# Patient Record
Sex: MALE | Race: WHITE | NOT HISPANIC OR LATINO | Employment: FULL TIME | URBAN - METROPOLITAN AREA
[De-identification: names, ages, dates, MRNs, and addresses within clinical notes are randomized per-mention and may not be internally consistent; named-entity substitution may affect disease eponyms.]

---

## 2019-03-11 ENCOUNTER — TELEPHONE (OUTPATIENT)
Dept: SCHEDULING | Facility: IMAGING CENTER | Age: 39
End: 2019-03-11

## 2019-03-11 ENCOUNTER — APPOINTMENT (OUTPATIENT)
Dept: MEDICAL GROUP | Facility: MEDICAL CENTER | Age: 39
End: 2019-03-11
Payer: COMMERCIAL

## 2019-03-12 ENCOUNTER — HOSPITAL ENCOUNTER (OUTPATIENT)
Dept: RADIOLOGY | Facility: MEDICAL CENTER | Age: 39
End: 2019-03-12
Attending: EMERGENCY MEDICINE
Payer: COMMERCIAL

## 2019-03-12 ENCOUNTER — OFFICE VISIT (OUTPATIENT)
Dept: URGENT CARE | Facility: PHYSICIAN GROUP | Age: 39
End: 2019-03-12
Payer: COMMERCIAL

## 2019-03-12 VITALS
TEMPERATURE: 97 F | DIASTOLIC BLOOD PRESSURE: 82 MMHG | RESPIRATION RATE: 18 BRPM | OXYGEN SATURATION: 98 % | SYSTOLIC BLOOD PRESSURE: 128 MMHG | WEIGHT: 213 LBS | HEART RATE: 95 BPM

## 2019-03-12 DIAGNOSIS — S93.401A SPRAIN OF RIGHT ANKLE, UNSPECIFIED LIGAMENT, INITIAL ENCOUNTER: ICD-10-CM

## 2019-03-12 DIAGNOSIS — S99.911A ANKLE INJURY, RIGHT, INITIAL ENCOUNTER: ICD-10-CM

## 2019-03-12 PROCEDURE — 99202 OFFICE O/P NEW SF 15 MIN: CPT | Performed by: EMERGENCY MEDICINE

## 2019-03-12 PROCEDURE — 73610 X-RAY EXAM OF ANKLE: CPT | Mod: RT

## 2019-03-12 ASSESSMENT — ENCOUNTER SYMPTOMS
SENSORY CHANGE: 0
LOSS OF SENSATION: 0
NUMBNESS: 0
LOSS OF MOTION: 0
TINGLING: 0
INABILITY TO BEAR WEIGHT: 0
FEVER: 0
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
## 2019-03-20 ENCOUNTER — OFFICE VISIT (OUTPATIENT)
Dept: MEDICAL GROUP | Facility: CLINIC | Age: 39
End: 2019-03-20
Payer: COMMERCIAL

## 2019-03-20 ENCOUNTER — APPOINTMENT (OUTPATIENT)
Dept: RADIOLOGY | Facility: IMAGING CENTER | Age: 39
End: 2019-03-20
Attending: FAMILY MEDICINE
Payer: COMMERCIAL

## 2019-03-20 VITALS
HEIGHT: 74 IN | BODY MASS INDEX: 27.34 KG/M2 | SYSTOLIC BLOOD PRESSURE: 122 MMHG | DIASTOLIC BLOOD PRESSURE: 88 MMHG | OXYGEN SATURATION: 95 % | WEIGHT: 213 LBS | TEMPERATURE: 98.8 F | HEART RATE: 100 BPM | RESPIRATION RATE: 14 BRPM

## 2019-03-20 DIAGNOSIS — M79.671 RIGHT FOOT PAIN: ICD-10-CM

## 2019-03-20 DIAGNOSIS — S93.492A SPRAIN OF ANTERIOR TALOFIBULAR LIGAMENT OF LEFT ANKLE, INITIAL ENCOUNTER: ICD-10-CM

## 2019-03-20 PROCEDURE — 73630 X-RAY EXAM OF FOOT: CPT | Mod: TC,RT | Performed by: FAMILY MEDICINE

## 2019-03-20 PROCEDURE — 99203 OFFICE O/P NEW LOW 30 MIN: CPT | Performed by: FAMILY MEDICINE

## 2019-03-20 ASSESSMENT — ENCOUNTER SYMPTOMS
SENSORY CHANGE: 0
FEVER: 0
SORE THROAT: 0
SHORTNESS OF BREATH: 0
FOCAL WEAKNESS: 0
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
## 2019-03-28 ENCOUNTER — PHYSICAL THERAPY (OUTPATIENT)
Dept: PHYSICAL THERAPY | Facility: REHABILITATION | Age: 39
End: 2019-03-28
Attending: FAMILY MEDICINE
Payer: COMMERCIAL

## 2019-03-28 DIAGNOSIS — S93.492A SPRAIN OF ANTERIOR TALOFIBULAR LIGAMENT OF LEFT ANKLE, INITIAL ENCOUNTER: ICD-10-CM

## 2019-03-28 PROCEDURE — 97161 PT EVAL LOW COMPLEX 20 MIN: CPT

## 2019-03-28 PROCEDURE — 97535 SELF CARE MNGMENT TRAINING: CPT

## 2019-03-28 ASSESSMENT — ENCOUNTER SYMPTOMS
PAIN SCALE AT HIGHEST: 2
EXACERBATED BY: PIVOTING
PAIN TIMING: WHEN ACTIVE
QUALITY: ACHING
EXACERBATED BY: TWISTING
QUALITY: STABBING
ALLEVIATING FACTORS: BRACE
PAIN SCALE AT LOWEST: 0
PAIN SCALE: 0

## 2019-03-28 ASSESSMENT — ACTIVITIES OF DAILY LIVING (ADL): POOR_BALANCE: 1
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
## 2019-04-02 ENCOUNTER — PHYSICAL THERAPY (OUTPATIENT)
Dept: PHYSICAL THERAPY | Facility: REHABILITATION | Age: 39
End: 2019-04-02
Attending: FAMILY MEDICINE
Payer: COMMERCIAL

## 2019-04-02 DIAGNOSIS — S93.492A SPRAIN OF ANTERIOR TALOFIBULAR LIGAMENT OF LEFT ANKLE, INITIAL ENCOUNTER: ICD-10-CM

## 2019-04-02 PROCEDURE — 97110 THERAPEUTIC EXERCISES: CPT

## 2019-04-02 NOTE — OP THERAPY DAILY TREATMENT
Outpatient Physical Therapy  DAILY TREATMENT     [facility] [address].  [address]  [place] [address]  Phone:  [phone]  Fax:  [phone]    Date: 04/02/2019    Patient: Sarabjit Escobar  YOB: 1980  MRN: [id number]     Time Calculation  Start time: 1530  Stop time: 1602 Time Calculation (min): 32 minutes     Chief Complaint: Ankle Problem    Visit #: 2    SUBJECTIVE:  Has been able to wean out of the brace about 75%. Mild achy and swelling at night.  Notices the restriction mostly with pointing toes down.  Was able to walk 1 mile without much issue.     OBJECTIVE:        Therapeutic Exercises (CPT 28278):     1. TM, incline 4%, 3.0 pmh x 6 min    2. TB ankle eversion, L2 2x20    3. Review of seated doming, x 2 min    4. Seated soleus press, x 20    5. Standing heel raise, x 20 , focus on neutral ankle    6. 'airplane' hinges, x 5 min    Therapeutic Treatments and Modalities:     1. Manual Therapy (CPT 66152), IASTM to R ankle mortise, peroneals and lateral ligaments.  Distraction GII-III with PROM in all planes.  AP talocural mobs GIII.      Time-based treatments/modalities:  Therapeutic exercise minutes (CPT 59461): 32 minutes       ASSESSMENT:   Response to treatment: Able to increase PROM to full PF today. Pain free HR in seated, but aching/pulling with HR in standing. Should be able to be fully out of brace by next visit     PLAN/RECOMMENDATIONS:   Plan for treatment: therapy treatment to continue next visit.  Planned interventions for next visit: continue with current treatment.

## 2019-04-09 ENCOUNTER — PHYSICAL THERAPY (OUTPATIENT)
Dept: PHYSICAL THERAPY | Facility: REHABILITATION | Age: 39
End: 2019-04-09
Attending: FAMILY MEDICINE
Payer: COMMERCIAL

## 2019-04-09 DIAGNOSIS — S93.492A SPRAIN OF ANTERIOR TALOFIBULAR LIGAMENT OF LEFT ANKLE, INITIAL ENCOUNTER: ICD-10-CM

## 2019-04-09 PROCEDURE — 97110 THERAPEUTIC EXERCISES: CPT

## 2019-04-09 PROCEDURE — 97140 MANUAL THERAPY 1/> REGIONS: CPT

## 2019-04-09 NOTE — OP THERAPY DAILY TREATMENT
"  Outpatient Physical Therapy  DAILY TREATMENT     [facility] [address].  [address]  [place] [address]  Phone:  [phone]  Fax:  [phone]    Date: 04/09/2019    Patient: Sarabjit Escobar  YOB: 1980  MRN: [id number]     Time Calculation  Start time: 1702  Stop time: 1737 Time Calculation (min): 35 minutes     Chief Complaint: Ankle Problem    Visit #: 3    SUBJECTIVE:  The ankle felt good and \"mobile\" after LV for a couple days. Still can tell it is a little restricted in plantarflexion.  Went to gym and did well with TM and elliptical, but R knee is achy from squats.  Still having to wear the brace about 50% of the time.  Painful when walking too far total.     OBJECTIVE:      Therapeutic Exercises (CPT 69645):     1. TM, incline 4%, 3.0 pmh x 5 min    2. Airex \"Fig 4\" drill, x 2 min, slight pause to challenge SLS    3. Standing heel raise, x 20    4. Rocker board, EO x 1 min, EC x 1 min    5. 1/2 roller , tandem hold x 1 min, rocking x 1 min    6. Shuttle, small jumps, 3C x 15 reps    Therapeutic Treatments and Modalities:     1. Manual Therapy (CPT 62449), IASTM to R ankle mortise, peroneals and lateral ligaments.  Distraction GII-III with PROM in all planes.  AP talocural mobs GIII.      Time-based treatments/modalities:  Manual therapy minutes (CPT 60376): 15 minutes  Therapeutic exercise minutes (CPT 23184): 20 minutes       ASSESSMENT:   Response to treatment: Slight improvements in loading tolerance, still restricted with PF.  Added PF str to HEP and to cont CV training at gym.     PLAN/RECOMMENDATIONS:   Plan for treatment: therapy treatment to continue next visit.  Planned interventions for next visit: continue with current treatment.      "

## 2019-04-19 ENCOUNTER — PHYSICAL THERAPY (OUTPATIENT)
Dept: PHYSICAL THERAPY | Facility: REHABILITATION | Age: 39
End: 2019-04-19
Attending: FAMILY MEDICINE
Payer: COMMERCIAL

## 2019-04-19 DIAGNOSIS — S93.492A SPRAIN OF ANTERIOR TALOFIBULAR LIGAMENT OF LEFT ANKLE, INITIAL ENCOUNTER: ICD-10-CM

## 2019-04-19 PROCEDURE — 97110 THERAPEUTIC EXERCISES: CPT

## 2019-04-19 PROCEDURE — 97140 MANUAL THERAPY 1/> REGIONS: CPT

## 2019-04-19 NOTE — OP THERAPY DAILY TREATMENT
Outpatient Physical Therapy  DAILY TREATMENT     [facility] Physical [address]  [address]  [place] [address]  Phone:  [phone]  Fax:  [phone]    Date: 04/19/2019    Patient: Sarabjit Escobar  YOB: 1980  MRN: [id number]     Time Calculation  Start time: 0806  Stop time: 0845 Time Calculation (min): 39 minutes     Chief Complaint: Ankle Problem    Visit #: 4    SUBJECTIVE:  No issues with the ankle if he wears the brace, some aching without, but feels as though he is steadily progressing. Is able to sit back on heels in yoga with stretching pain, but ROM now available.     OBJECTIVE:      Therapeutic Exercises (CPT 24839):     1. TM, jog x 5 min    2. Ankle eversion, L2 x 20    3. Heel raises, x 20 SL    Therapeutic Treatments and Modalities:     1. Neuromuscular Re-education (CPT 85126)    Therapeutic Treatment and Modalities Summary:   - Star excursion balance x 3 ea side  - Running re-ed: fig 4 dril x 10 min  - 3 way jump: 2 feet to 2 feet x 10 ea way    Time-based treatments/modalities:  Therapeutic exercise minutes (CPT 38788): 10 minutes  Neuromusc re-ed, balance, coor, post minutes (CPT 38494): 29 minutes       ASSESSMENT:   Response to treatment: Able to progress into plyometrics with double limb support with good tolerance.  Has a strong heel strike with running technique, would benefit from further re-ed to enhance mechanics and decrease jt stresses.     PLAN/RECOMMENDATIONS:   Plan for treatment: therapy treatment to continue next visit.  Planned interventions for next visit: continue with current treatment.

## 2019-04-26 ENCOUNTER — PHYSICAL THERAPY (OUTPATIENT)
Dept: PHYSICAL THERAPY | Facility: REHABILITATION | Age: 39
End: 2019-04-26
Attending: FAMILY MEDICINE
Payer: COMMERCIAL

## 2019-04-26 DIAGNOSIS — S93.492A SPRAIN OF ANTERIOR TALOFIBULAR LIGAMENT OF LEFT ANKLE, INITIAL ENCOUNTER: ICD-10-CM

## 2019-04-26 PROCEDURE — 97112 NEUROMUSCULAR REEDUCATION: CPT

## 2019-04-26 NOTE — OP THERAPY DAILY TREATMENT
Outpatient Physical Therapy  DAILY TREATMENT     [facility] [address].  [address]  [place] [address]  Phone:  [phone]  Fax:  [phone]    Date: 04/26/2019    Patient: Sarabjit Escobar  YOB: 1980  MRN: [id number]     Time Calculation  Start time: 0803  Stop time: 0832 Time Calculation (min): 29 minutes     Chief Complaint: Ankle Problem    Visit #: 5    SUBJECTIVE:  Had been feeling really good, but thinks he overdid bw going for a walk and then having to walk a lot at work.  Over [id number] steps, 1/10 ache in the anterior ankle.     OBJECTIVE:        Therapeutic Treatments and Modalities:     1. Neuromuscular Re-education (CPT 62843)    Therapeutic Treatment and Modalities Summary:   - BOSU step ups x 20 with R leading  - Ankle rocker board: x 2 min with EO and EC  - Airex: small 'jog' to SLS on R  - Star excursion balance x 3 ea side  - Running re-ed: fig 4 dril x 10 min  - 3 way jump: 2 feet to 2 feet x 10 ea way  - Small 2 foot to single limb x 10 reps  - Ktape R lateral ankle to medial arch support with 50% str.     Time-based treatments/modalities:  Neuromusc re-ed, balance, coor, post minutes (CPT 42398): 29 minutes       ASSESSMENT:   Response to treatment: Much improved midfoot strike with jog, allowing improved comfort due to decreased impact forces.     PLAN/RECOMMENDATIONS:   Plan for treatment: therapy treatment to continue next visit.  Planned interventions for next visit: continue with current treatment. Likely on track to d/c next session.

## 2019-05-02 NOTE — OP THERAPY DAILY TREATMENT
Outpatient Physical Therapy  DAILY TREATMENT     [facility] Physical [address].  [address]  [place] [address]  Phone:  [phone]  Fax:  [phone]    Date: 05/03/2019    Patient: Sarabjit Escobar  YOB: 1980  MRN: [id number]     Time Calculation  Start time: 0737  Stop time: 0802 Time Calculation (min): 25 minutes     Chief Complaint: Ankle Problem    Visit #: 6    SUBJECTIVE:  Pt is 7 min late to appt due to traffic.  Reports yoga is going well, without limitation.  Notes a 1/10 ache after pushing it too much with walking or hiking, but has no doubt it will continue to improve over time and with HEP.      OBJECTIVE:Lower Extremity Functional Scale Total: 91.25    Star excusion balance: L (fwd 84, lateral and cross= 112 in), R ( fwd 65, lateral 110, cross 108 in)    Therapeutic Treatments and Modalities:     1. Neuromuscular Re-education (CPT 94905)    Therapeutic Treatment and Modalities Summary:   - SL heel raise controlling subtalar neutral, x 20 ea side  - Ankle rocker board: x 2 min with EO and EC, SL x 1 min ea  - Airex: small 'jog' to SLS on R  - Star excursion balance test  - Retest of fwd broad jump      Time-based treatments/modalities:  Neuromusc re-ed, balance, coor, post minutes (CPT 38365): 25 minutes       ASSESSMENT:   Response to treatment: See d/c note     PLAN/RECOMMENDATIONS:   Plan for treatment: no further treatment needed.

## 2019-05-03 ENCOUNTER — PHYSICAL THERAPY (OUTPATIENT)
Dept: PHYSICAL THERAPY | Facility: REHABILITATION | Age: 39
End: 2019-05-03
Attending: FAMILY MEDICINE
Payer: COMMERCIAL

## 2019-05-03 DIAGNOSIS — S93.492A SPRAIN OF ANTERIOR TALOFIBULAR LIGAMENT OF LEFT ANKLE, INITIAL ENCOUNTER: ICD-10-CM

## 2019-05-03 PROCEDURE — 97112 NEUROMUSCULAR REEDUCATION: CPT

## 2019-05-03 NOTE — OP THERAPY DISCHARGE SUMMARY
Outpatient Physical Therapy  DISCHARGE SUMMARY NOTE      [facility] Therapy [place] [address]  [address]  [place] [address]  Phone:  [phone]  Fax:  [phone]    Date of Visit: 05/03/2019    Patient: Sarabjit Escobar  YOB: 1980  MRN: [id number]     Referring Provider: Rodney Staton M.D.  [address]  [address]  [place] [address]   Referring Diagnosis Sprain of other ligament of left ankle, initial encounter [S93.492A]     Physical Therapy Occurrence Codes    Date of onset of impairment:  3/9/19   Date physical therapy care plan established or reviewed:  3/28/19   Date physical therapy treatment started:  3/28/19          Functional Limitations and Severity Modifiers  Lower Extremity Functional Scale Total: 91.25     Your patient is being discharged from Physical Therapy with the following comments:   · Goals met    Comments:  Mr. Escobar was seen for 6 PT sessions treating his R ankle pain after sprain.  Pt demonstrates full ROM and strength in the R ankle.  He is able to tolerate pyrometrics with no more than 1/10 ache localized at the ATF.  Pt has returned to yoga and hiking without impairment, but has yet to push into running at this time.  Pt is indep with HEP and anticipate full recovery with time and continuation of his home program.  Has been advised on appropriate way to progress back into strength.  Running mechanics have normalized when tested in clinic.       Recommendations:  D/C to HEP as pt is indep and no longer requires skilled guidance. Thank you for the referral!    Nidhi Mir, PT, DPT    Date: 5/3/2019

## 2019-05-08 ENCOUNTER — OCCUPATIONAL MEDICINE (OUTPATIENT)
Dept: URGENT CARE | Facility: CLINIC | Age: 39
End: 2019-05-08
Payer: COMMERCIAL

## 2019-05-08 DIAGNOSIS — S61.230A PUNCTURE WOUND OF RIGHT INDEX FINGER: ICD-10-CM

## 2019-05-08 PROCEDURE — 90715 TDAP VACCINE 7 YRS/> IM: CPT | Mod: 29 | Performed by: NURSE PRACTITIONER

## 2019-05-08 PROCEDURE — 99203 OFFICE O/P NEW LOW 30 MIN: CPT | Mod: 25,29 | Performed by: NURSE PRACTITIONER

## 2019-05-08 PROCEDURE — 90471 IMMUNIZATION ADMIN: CPT | Mod: 29 | Performed by: NURSE PRACTITIONER

## 2019-05-08 RX ORDER — CEPHALEXIN 500 MG/1
500 CAPSULE ORAL 4 TIMES DAILY
Qty: 28 CAP | Refills: 0 | Status: SHIPPED | OUTPATIENT
Start: 2019-05-08 | End: 2019-05-15

## 2019-05-08 NOTE — LETTER
EMPLOYEE’S CLAIM FOR COMPENSATION/ REPORT OF INITIAL TREATMENT  FORM C-4    EMPLOYEE’S CLAIM - PROVIDE ALL INFORMATION REQUESTED   First Name  Sarabjit Last Name  Shawn Birthdate                    1980                Sex  male Claim Number   Home Address  [address] Age  38 y.o. Height   Weight   SSN     [facility] Zip  [address] Telephone  [phone] (home)    Mailing Address  [address] [facility] Zip  [address] Primary Language Spoken  English    Insurer  [employer] Third Party   [employer]   Employee's Occupation (Job Title) When Injury or Occupational Disease Occurred      Employer's Name  [employer]  Telephone  [phone]    Employer Address  [address]  [facility]  Zip  [address]    Date of Injury  5/8/2019               Hour of Injury  2:45 PM Date Employer Notified  5/8/2019 Last Day of Work after Injury or Occupational Disease  5/8/2019 Supervisor to Whom Injury Reported  [facility]   Address or Location of Accident (if applicable)  [[address] [facility]]   What were you doing at the time of accident? (if applicable)  Moving a pallet    How did this injury or occupational disease occur? (Be specific an answer in detail. Use additional sheet if necessary)  Moving wood pallet with a piece of equiptment on it a screw coming out of the wood poked through my gloves and punctured my right pointer finger   If you believe that you have an occupational disease, when did you first have knowledge of the disability and it relationship to your employment?  n/a Witnesses to the Accident  n/a      Nature of Injury or Occupational Disease  Workers' Compensation  Part(s) of Body Injured or Affected  Finger (R), ,     I certify that the above is true and correct to the best of my knowledge and that I have provided this information in  order to obtain the benefits of [place]  I hereby authorize any physician, chiropractor, surgeon, practitioner, or other person, any hospital, including [facility] hospital, any medical service organization, any insurance company, or other institution or organization to release to each other, any medical or other information, including benefits paid or payable, pertinent to this injury or disease, except information relative to diagnosis, treatment and/or counseling for AIDS, psychological conditions, alcohol or controlled substances, for which I must give specific authorization.  A Photostat of this authorization shall be as valid as the original.     Date 5/8/19   Place [facility]   Employee’s Signature   THIS REPORT MUST BE COMPLETED AND MAILED WITHIN 3 WORKING DAYS OF TREATMENT   Place  [facility]  Name of Facility  [facility]   Date  5/8/2019 Diagnosis  (S61.230A) Puncture wound of right index finger Is there evidence the injured employee was under the influence of alcohol and/or another controlled substance at the time of accident?   Hour  4:39 PM Description of Injury or Disease  The encounter diagnosis was Puncture wound of right index finger. No   Treatment  May use NSAID as needed for any pain/swelling. Clean area with soap and water as needed for any d/c. Return in 1 week after antibiotics finished for Discharge/MMI. Monitor for redness, swelling, drainage, red streaking up hand/arm, fever, malaise, pain- must return for re-evaluation.    Have you advised the patient to remain off work five days or more? No   X-Ray Findings      If Yes   From Date  To Date      From information given by the employee, together with medical evidence, can you directly connect this injury or occupational disease as job incurred?  Yes If No Full Duty  Yes Modified Duty     "  Is additional medical care by a physician indicated?  Yes If Modified Duty, Specify any Limitations / Restrictions      Do you know of any previous injury or disease contributing to this condition or occupational disease?                            No   Date  5/8/2019 Print Doctor’s Name ABHISHEK PELAYO GRP I certify the employer’s copy of  this form was mailed on:   Address  [address], [address] Insurer’s Use Only     [facility] Zip  [address]    Provider’s Tax ID Number  [id number] Telephone  Dept: [phone]        francisca-TOYA Strickland   e-Signature: Dr. Jesse Estrella, Medical Director Degree           ORIGINAL-TREATING PHYSICIAN OR CHIROPRACTOR    PAGE 2-INSURER/TPA    PAGE 3-EMPLOYER    PAGE 4-EMPLOYEE             Form C-4 (rev10/07)              BRIEF DESCRIPTION OF RIGHTS AND BENEFITS  (Pursuant to [place]RS 616C.050)    Notice of Injury or Occupational Disease (Incident Report Form C-1): If an injury or occupational disease (OD) arises out of and in the  course of employment, you must provide written notice to your employer as soon as practicable, but no later than 7 days after the accident or  OD. Your employer shall maintain a sufficient supply of the required forms.    Claim for Compensation (Form C-4): If medical treatment is sought, the form C-4 is available at the place of initial treatment. A completed  \"Claim for Compensation\" (Form C-4) must be filed within 90 days after an accident or OD. The treating physician or chiropractor must,  within 3 working days after treatment, complete and mail to the employer, the employer's insurer and third-party , the Claim for  Compensation.    Medical Treatment: If you require medical treatment for your on-the-job injury or OD, you may be required to select a physician or  chiropractor from a list provided by your workers’ compensation insurer, if it has contracted with an Organization for Managed Care (MCO) or  Preferred " Provider Organization (PPO) or providers of health care. If your employer has not entered into a contract with an MCO or PPO, you  may select a physician or chiropractor from the Panel of Physicians and Chiropractors. Any medical costs related to your industrial injury or  OD will be paid by your insurer.    Temporary Total Disability (TTD): If your doctor has certified that you are unable to work for a period of at least 5 consecutive days, or 5  cumulative days in a 20-day period, or places restrictions on you that your employer does not accommodate, you may be entitled to TTD  compensation.    Temporary Partial Disability (TPD): If the wage you receive upon reemployment is less than the compensation for TTD to which you are  entitled, the insurer may be required to pay you TPD compensation to make up the difference. TPD can only be paid for a maximum of 24  months.    Permanent Partial Disability (PPD): When your medical condition is stable and there is an indication of a PPD as a result of your injury or  OD, within 30 days, your insurer must arrange for an evaluation by a rating physician or chiropractor to determine the degree of your PPD. The  amount of your PPD award depends on the date of injury, the results of the PPD evaluation and your age and wage.    Permanent Total Disability (PTD): If you are medically certified by a treating physician or chiropractor as permanently and totally disabled  and have been granted a PTD status by your insurer, you are entitled to receive monthly benefits not to exceed 66 2/3% of your average  monthly wage. The amount of your PTD payments is subject to reduction if you previously received a PPD award.    Vocational Rehabilitation Services: You may be eligible for vocational rehabilitation services if you are unable to return to the job due to a  permanent physical impairment or permanent restrictions as a result of your injury or occupational disease.    Transportation and  Per Mayra Reimbursement: You may be eligible for travel expenses and per mayra associated with medical treatment.    Reopening: You may be able to reopen your claim if your condition worsens after claim closure.    Appeal Process: If you disagree with a written determination issued by the insurer or the insurer does not respond to your request, you may  appeal to the Department of Administration, , by following the instructions contained in your determination letter. You must  appeal the determination within 70 days from the date of the determination letter at [address], [address], [place], [place]  [address], or [address], [address], [place], [place] [address]. If you disagree with the  decision, you may appeal to the  Department of Administration, . You must file your appeal within 30 days from the date of the  decision  letter at [address], [address], [place], [place] [address], or [address], [place], [place] [address]. If you  disagree with a decision of an , you may file a petition for judicial review with the District Court. You must do so within 30  days of the Appeal Officer’s decision. You may be represented by an  at your own expense or you may contact the [facility] for possible  representation.    [place]  for Injured Workers (NAIW): If you disagree with a  decision, you may request that NAIW represent you  without charge at an  Hearing. For information regarding denial of benefits, you may contact the [facility] at: [address], [address], [place] [address], [phone], or [address], [address], [place] [address], [phone]    To File a Complaint with the Division: If you wish to file a complaint with the  of the Division of Industrial Relations (DIR),  please contact the Workers’ Compensation  Section, [address], [address], [place], [place] [address], telephone [phone]) [phone], or  [address], [address], [place], [place] [address], telephone [phone].    For assistance with Workers’ Compensation Issues: you may contact the Office of the Governor Consumer Health Assistance, [address] [place] [address], [address], [place], [place] [address], Toll Free 1-832.944.4272, Web site: http://govcha.[facility].nv., E-mail  [email or web address].nv.                                                                                                                                                                                                                                   __________________________________________________________________                                                                   ______5/8/19___________                Employee Name / Signature                                                                                                                                                       Date                                                                                                                                                                                                     D-2 (rev. 10/07)

## 2019-05-08 NOTE — LETTER
"   [facility]  [address] [facility] [address], [address] [place] [address]  Phone:  [phone] - Fax:  [phone]   [facility] Progress Report and Disability Certification  Date of Service: 5/8/2019   No Show:  No  Date / Time of Next Visit: 5/15/2019   Claim Information   Patient Name: Sarabjit Escobar  Claim Number:     Employer: [employer]  Date of Injury: 5/8/2019     Insurer / TPA: [employer]  ID / SSN:     Occupation:   Diagnosis: The encounter diagnosis was Puncture wound of right index finger.    Medical Information   Related to Industrial Injury? Yes    Subjective Complaints:  DOI 5/8/19. Puncture wound with harpal screw on wood pallet. Denies pain, swelling, redness, d/c. No active bleeding. Cleaned with soap and water. Last tetanus unknown but \"possibly in last 8-9 years\".    Objective Findings: A/O x 3. Skin p/w/d. Skin sensation intact. No redness, swelling, d/c or active bleeding. No TTP at site. FROM of right index finger. Pinpoint puncture to medial aspect of right 1st metacarpal joint. Tetanus given today.   Pre-Existing Condition(s):     Assessment:   Initial Visit    Status: Additional Care Required  Permanent Disability:No    Plan: Medication  Comments:Keflex    Diagnostics:      Comments:       Disability Information   Status: Released to Full Duty    From:  5/8/2019  Through: 5/15/2019 Restrictions are:     Physical Restrictions   Sitting:    Standing:    Stooping:    Bending:      Squatting:    Walking:    Climbing:    Pushing:      Pulling:    Other:    Reaching Above Shoulder (L):   Reaching Above Shoulder (R):       Reaching Below Shoulder (L):    Reaching Below Shoulder (R):      Not to exceed Weight Limits   Carrying(hrs):   Weight Limit(lb):   Lifting(hrs):   Weight  Limit(lb):     Comments: May use NSAID as needed for any pain/swelling. Clean area with soap and water as needed for any d/c. Return in 1 week after antibiotics " finished for Discharge/MMI. Monitor for redness, swelling, drainage, red streaking up hand/arm, fever, malaise, pain- must return for re-evaluation.    Repetitive Actions   Hands: i.e. Fine Manipulations from Grasping:     Feet: i.e. Operating Foot Controls:     Driving / Operate Machinery:     Physician Name: [facility] Physician Signature: TOYA Reyes e-Signature: Dr. Jesse Estrella, Medical Director   Clinic Name / Location: [address], [address] [place] [address] Clinic Phone Number: Dept: [phone]   Appointment Time: 4:00 Pm Visit Start Time: 4:39 PM   Check-In Time:  3:47 Pm Visit Discharge Time:  5:04 PM   Original-Treating Physician or Chiropractor    Page 2-Insurer/TPA    Page 3-Employer    Page 4-Employee

## 2019-05-09 ASSESSMENT — ENCOUNTER SYMPTOMS
SENSORY CHANGE: 0
CHILLS: 0
WEAKNESS: 0
TINGLING: 0
BRUISES/BLEEDS EASILY: 0
FALLS: 0
FEVER: 0
MYALGIAS: 0

## 2019-05-09 NOTE — PROGRESS NOTES
"Subjective:      Sarabjit Escobar is a 38 y.o. male who presents with Finger Injury (WC New, Pt sates he is seeking a tetanus shot after puncture with a harpal nail. )      DOI 5/8/19. Puncture wound with harpal screw on wood pallet. Denies pain, swelling, redness, d/c. No active bleeding. Cleaned with soap and water. Last tetanus unknown but \"possibly in last 8-9 years\".      HPI  See above   PMH:  has no past medical history on file.  MEDS:   Current Outpatient Prescriptions:   •  cephALEXin (KEFLEX) 500 MG Cap, Take 1 Cap by mouth 4 times a day for 7 days., Disp: 28 Cap, Rfl: 0  •  ALBUTEROL SULFATE HFA INH, Inhale 1 Puff by mouth as needed., Disp: , Rfl:   ALLERGIES: No Known Allergies  SURGHX: History reviewed. No pertinent surgical history.  SOCHX:  reports that he has never smoked. He has never used smokeless tobacco. He reports that he drinks alcohol. He reports that he does not use drugs.  FH: Family history was reviewed, no pertinent findings to report    Review of Systems   Constitutional: Negative for chills, fever and malaise/fatigue.   Musculoskeletal: Negative for falls, joint pain and myalgias.   Skin: Negative for itching and rash.   Neurological: Negative for tingling, sensory change and weakness.   Endo/Heme/Allergies: Does not bruise/bleed easily.   All other systems reviewed and are negative.         Objective:     There were no vitals taken for this visit.   There were no vitals filed for this visit.    Physical Exam   Constitutional: He is oriented to person, place, and time. Vital signs are normal. He appears well-developed and well-nourished. He is active and cooperative.  Non-toxic appearance. He does not have a sickly appearance. He does not appear ill. No distress.   HENT:   Head: Normocephalic.   Cardiovascular: Normal rate.    Pulmonary/Chest: Effort normal.   Musculoskeletal: He exhibits no edema, tenderness or deformity.   Neurological: He is alert and oriented to person, " place, and time.   Skin: Skin is warm and dry. No rash noted. He is not diaphoretic. No erythema.   Vitals reviewed.      A/O x 3. Skin p/w/d. Skin sensation intact. No redness, swelling, d/c or active bleeding. No TTP at site. FROM of right index finger. Pinpoint puncture to medial aspect of right 1st metacarpal joint. Tetanus given today.       Assessment/Plan:     1. Puncture wound of right index finger  - Tdap =>6yo IM  - cephALEXin (KEFLEX) 500 MG Cap; Take 1 Cap by mouth 4 times a day for 7 days.  Dispense: 28 Cap; Refill: 0    May use NSAID as needed for any pain/swelling. Clean area with soap and water as needed for any d/c. Return in 1 week after antibiotics finished for Discharge/MMI. Monitor for redness, swelling, drainage, red streaking up hand/arm, fever, malaise, pain- must return for re-evaluation.

## 2019-05-16 ENCOUNTER — OCCUPATIONAL MEDICINE (OUTPATIENT)
Dept: URGENT CARE | Facility: CLINIC | Age: 39
End: 2019-05-16
Payer: COMMERCIAL

## 2019-05-16 ENCOUNTER — TELEPHONE (OUTPATIENT)
Dept: SCHEDULING | Facility: IMAGING CENTER | Age: 39
End: 2019-05-16

## 2019-05-16 VITALS
TEMPERATURE: 98 F | RESPIRATION RATE: 16 BRPM | HEART RATE: 74 BPM | OXYGEN SATURATION: 97 % | WEIGHT: 213 LBS | BODY MASS INDEX: 27.34 KG/M2 | HEIGHT: 74 IN | DIASTOLIC BLOOD PRESSURE: 90 MMHG | SYSTOLIC BLOOD PRESSURE: 128 MMHG

## 2019-05-16 DIAGNOSIS — S61.230A PUNCTURE WOUND OF RIGHT INDEX FINGER: ICD-10-CM

## 2019-05-16 PROCEDURE — 99213 OFFICE O/P EST LOW 20 MIN: CPT | Mod: 29 | Performed by: PHYSICIAN ASSISTANT

## 2019-05-16 ASSESSMENT — ENCOUNTER SYMPTOMS
FEVER: 0
CHILLS: 0

## 2019-05-16 NOTE — PROGRESS NOTES
"Subjective:   Sarabjit Escobar is a 38 y.o. male who presents for Hand Injury (WC New, Pt states he is improving)        DOI 5/8/19. Puncture wound with harpal screw on wood pallet. Denies pain, swelling, redness, d/c. Received tetanus vaccine last week.  He states that its completely healed and reports no pain at the site.       Review of Systems   Constitutional: Negative for chills and fever.       PMH:  has no past medical history on file.  MEDS:   Current Outpatient Prescriptions:   •  ALBUTEROL SULFATE HFA INH, Inhale 1 Puff by mouth as needed., Disp: , Rfl:   ALLERGIES: No Known Allergies  SURGHX: History reviewed. No pertinent surgical history.  SOCHX:  reports that he has never smoked. He has never used smokeless tobacco. He reports that he drinks alcohol. He reports that he does not use drugs.  FH: Family history was reviewed, no pertinent findings to report   Objective:   /90   Pulse 74   Temp 36.7 °C (98 °F) (Temporal)   Resp 16   Ht 1.88 m (6' 2\")   Wt 96.6 kg (213 lb)   SpO2 97%   BMI 27.35 kg/m²   Physical Exam   Constitutional: He appears well-developed and well-nourished.  Non-toxic appearance. No distress.   HENT:   Head: Normocephalic and atraumatic.   Right Ear: External ear normal.   Left Ear: External ear normal.   Nose: Nose normal.   Neck: Neck supple.   Pulmonary/Chest: Effort normal. No respiratory distress.   Musculoskeletal:        Right hand: He exhibits normal range of motion, no tenderness, no bony tenderness, normal capillary refill, no deformity, no laceration and no swelling. Normal sensation noted. Normal strength noted.   Neurological: He is alert.   Skin: Skin is warm, dry and intact. Capillary refill takes less than 2 seconds.   Psychiatric: He has a normal mood and affect. His speech is normal and behavior is normal. Judgment and thought content normal. Cognition and memory are normal.   Vitals reviewed.        Assessment/Plan:   1. Puncture wound of right " index finger    Differential diagnosis, natural history, supportive care, and indications for immediate follow-up discussed.

## 2019-05-16 NOTE — LETTER
[facility]  [address] [facility] [address] [place] [address]  Phone:  [phone] - Fax:  [phone]   [facility] Progress Report and Disability Certification  Date of Service: 5/16/2019   No Show:  No  Date / Time of Next Visit:     Claim Information   Patient Name: Sarabjit Escobar  Claim Number:     Employer: [employer]  Date of Injury: 5/8/2019     Insurer / TPA: [employer]  ID / SSN:     Occupation:   Diagnosis: The encounter diagnosis was Puncture wound of right index finger.    Medical Information   Related to Industrial Injury? Yes    Subjective Complaints:  DOI 5/8/19. Puncture wound with harpal screw on wood pallet. Denies pain, swelling, redness, d/c. Received tetanus vaccine last week.  He states that its completely healed and reports no pain at the site.    Objective Findings: usculoskeletal:        Right hand: He exhibits normal range of motion, no tenderness, no bony tenderness, normal capillary refill, no deformity, no laceration and no swelling. Normal sensation noted. Normal strength noted.   Neurological: He is alert.   Skin: Skin is warm, dry and intact. Capillary refill takes less than 2 seconds.    Pre-Existing Condition(s):     Assessment:   Condition Improved    Status: Discharged /  MMI  Permanent Disability:No    Plan:      Diagnostics:      Comments:  Stop antibiotics.  Discharged.    Disability Information   Status: Released to Full Duty    From:     Through:   Restrictions are:     Physical Restrictions   Sitting:    Standing:    Stooping:    Bending:      Squatting:    Walking:    Climbing:    Pushing:      Pulling:    Other:    Reaching Above Shoulder (L):   Reaching Above Shoulder (R):       Reaching Below Shoulder (L):    Reaching Below Shoulder (R):      Not to exceed Weight Limits   Carrying(hrs):   Weight Limit(lb):   Lifting(hrs):   Weight  Limit(lb):     Comments:      Repetitive Actions   Hands: i.e. Fine  Manipulations from Grasping:     Feet: i.e. Operating Foot Controls:     Driving / Operate Machinery:     Physician Name: Ashanti Kuhn P.A.-C. Physician Signature: ASHANTI Riggs P.A.-C. e-Signature: Dr. Jesse Estrella, Medical Director   Clinic Name / Location: [address], [address]  [place] [address] Clinic Phone Number: Dept: [phone]   Appointment Time: 9:00 Am Visit Start Time: 9:13 AM   Check-In Time:  9:08 Am Visit Discharge Time:  9:44 AM   Original-Treating Physician or Chiropractor    Page 2-Insurer/TPA    Page 3-Employer    Page 4-Employee

## 2019-05-22 ENCOUNTER — OFFICE VISIT (OUTPATIENT)
Dept: MEDICAL GROUP | Facility: CLINIC | Age: 39
End: 2019-05-22
Payer: COMMERCIAL

## 2019-05-22 VITALS
BODY MASS INDEX: 27.34 KG/M2 | SYSTOLIC BLOOD PRESSURE: 130 MMHG | HEIGHT: 74 IN | RESPIRATION RATE: 18 BRPM | TEMPERATURE: 98.3 F | WEIGHT: 213 LBS | DIASTOLIC BLOOD PRESSURE: 86 MMHG | OXYGEN SATURATION: 95 % | HEART RATE: 80 BPM

## 2019-05-22 DIAGNOSIS — S93.492D SPRAIN OF ANTERIOR TALOFIBULAR LIGAMENT OF LEFT ANKLE, SUBSEQUENT ENCOUNTER: ICD-10-CM

## 2019-05-22 PROCEDURE — 99213 OFFICE O/P EST LOW 20 MIN: CPT | Performed by: FAMILY MEDICINE

## 2019-05-22 ASSESSMENT — ENCOUNTER SYMPTOMS
SENSORY CHANGE: 0
FEVER: 0
SORE THROAT: 0
SHORTNESS OF BREATH: 0
FOCAL WEAKNESS: 0

## 2019-05-22 NOTE — PROGRESS NOTES
"Subjective:     Sarabjit Escobar is a 38 y.o. male who presents for Foot Problem (F/V R foot pain )    HPI  Pt presents for follow-up right foot/ankle pain   Completed 5 sessions of PT   Feels he has made great progress, ~90% better   Does still have some pain when running, but minimal pain with his daily activities   No new falls or injuries   No numbness/tingling   Patient is happy with progress and does not feel he needs any more physical therapy    Review of Systems   Constitutional: Negative for fever.   HENT: Negative for sore throat.    Respiratory: Negative for shortness of breath.    Cardiovascular: Negative for chest pain.   Skin: Negative for rash.   Neurological: Negative for sensory change and focal weakness.     PMH: No chronic medical problems, history of prior ankle sprains  MEDS:   Current Outpatient Prescriptions:   •  ALBUTEROL SULFATE HFA INH, Inhale 1 Puff by mouth as needed., Disp: , Rfl:   ALLERGIES: No Known Allergies  SURGHX: No past surgical history on file.  SOCHX:  reports that he has never smoked. He has never used smokeless tobacco. He reports that he drinks alcohol. He reports that he does not use drugs.  FH: Family history was reviewed, not contributing to acute injury     Objective:   /86 (BP Location: Left arm, Patient Position: Sitting, BP Cuff Size: Adult)   Pulse 80   Temp 36.8 °C (98.3 °F) (Temporal)   Resp 18   Ht 1.88 m (6' 2\")   Wt 96.6 kg (213 lb)   SpO2 95%   BMI 27.35 kg/m²     Physical Exam   Constitutional: He is oriented to person, place, and time. He appears well-developed and well-nourished. No distress.   HENT:   Head: Normocephalic and atraumatic.   Musculoskeletal:   Left ankle/foot:  Appearance - No bruising, erythema, or deformity appreciated  Palpation - No TTP along medial malleolus or deltoid ligament.  No TTP of lateral malleolus, ATFL, CFL, or PTFL.  No TTP along midfoot, base of the 5th metatarsal, MTP joints, or toes.   ROM - FROM " throughout  Strength - 5/5 throughout  Special testing - Neg squeeze test, neg drawer test  Neurovascular - 2+ dorsalis pedis and posterior tibial.  Sensation intact and equal bilaterally  Gait - nonantalgic   Neurological: He is alert and oriented to person, place, and time.   Skin: Skin is warm and dry. No rash noted. He is not diaphoretic.   Psychiatric: He has a normal mood and affect. His behavior is normal. Judgment and thought content normal.     Assessment/Plan:   Assessment    1. Sprain of anterior talofibular ligament of left ankle, subsequent encounter  Patient is a 38-year-old male with left ATFL sprain.  Has attended physical therapy and completed all his goals.  He is overall doing very well.  Advised that recurrent slight pains over the next few months can be typical when stepping wrong, and should wear an ankle brace with strenuous activity for the next 6 months to prevent recurrence of sprain.  Emphasized importance of continuing home exercise program.  He would like to follow-up on an as-needed basis.

## 2019-06-26 ENCOUNTER — OFFICE VISIT (OUTPATIENT)
Dept: INTERNAL MEDICINE | Facility: MEDICAL CENTER | Age: 39
End: 2019-06-26
Payer: COMMERCIAL

## 2019-06-26 VITALS
HEART RATE: 73 BPM | HEIGHT: 74 IN | SYSTOLIC BLOOD PRESSURE: 132 MMHG | WEIGHT: 213.8 LBS | BODY MASS INDEX: 27.44 KG/M2 | TEMPERATURE: 97.6 F | OXYGEN SATURATION: 98 % | DIASTOLIC BLOOD PRESSURE: 86 MMHG

## 2019-06-26 DIAGNOSIS — Z86.39 HISTORY OF IRON DEFICIENCY: ICD-10-CM

## 2019-06-26 DIAGNOSIS — Z83.3 FAMILY HISTORY OF DIABETES MELLITUS: ICD-10-CM

## 2019-06-26 DIAGNOSIS — Z13.220 SCREENING FOR LIPID DISORDERS: ICD-10-CM

## 2019-06-26 DIAGNOSIS — J45.20 MILD INTERMITTENT ASTHMA WITHOUT COMPLICATION: ICD-10-CM

## 2019-06-26 DIAGNOSIS — R19.7 DIARRHEA, UNSPECIFIED TYPE: ICD-10-CM

## 2019-06-26 PROCEDURE — 99204 OFFICE O/P NEW MOD 45 MIN: CPT | Mod: GC | Performed by: INTERNAL MEDICINE

## 2019-06-26 RX ORDER — ALBUTEROL SULFATE 90 UG/1
AEROSOL, METERED RESPIRATORY (INHALATION)
COMMUNITY
Start: 2019-06-24 | End: 2019-08-25 | Stop reason: SDUPTHER

## 2019-06-26 ASSESSMENT — PATIENT HEALTH QUESTIONNAIRE - PHQ9: CLINICAL INTERPRETATION OF PHQ2 SCORE: 0

## 2019-06-26 NOTE — PROGRESS NOTES
New Patient to Establish    Reason to establish: New patient to establish    CC: asthma, loose stools, iron deficiency    HPI:   39 y/o pleasant male with past medical history of asthma presents to establish care and complains of intermittent loose stools.     Patient notes intermittent loose stools, about once a month, since October 2018 after moving from [place] to [place] and starting a new job. Describes 2-4 loose watery bowel movements a day for about 1 day out of the month. Regular bowel movement consists of one bowel movement a day.   Patient denies associated abdominal pain, cramping, blood per rectum, melena, nausea, vomiting, weight loss, or mucous or pus in stool. No joseph or white odorous floating stool. Occasional heartburn. No fevers, chills, night sweats.   Patient does give history of iron deficiency in the past and mild lactose intolerance for which he takes lactase pills when he eats yogurt, cheese, or milk. Other than that, he has noted that often times the symptoms occur after egg intake, but not every time after egg intake.   Patient denies any recent travel outside the country or swimming/drinking fresh water. Last time he traveled was over 2 years ago to [place] and [place].   Patient is concerned because this is enough of a change from his regular bowel movements.       Patient Active Problem List    Diagnosis Date Noted   • Mild intermittent asthma without complication 06/26/2019   • Family history of diabetes mellitus 06/26/2019   • BMI 27.0-27.9,adult 06/26/2019   • History of iron deficiency 06/26/2019       History reviewed. No pertinent past medical history.    Current Outpatient Prescriptions   Medication Sig Dispense Refill   • VENTOLIN  (90 Base) MCG/ACT Aero Soln inhalation aerosol        No current facility-administered medications for this visit.        Allergies as of 06/26/2019   • (No Known Allergies)       Social History     Social History   • Marital status: Single      "Spouse name: N/A   • Number of children: N/A   • Years of education: N/A     Occupational History   • Not on file.     Social History Main Topics   • Smoking status: Never Smoker   • Smokeless tobacco: Never Used   • Alcohol use 4.2 oz/week     5 Cans of beer, 2 Shots of liquor per week   • Drug use: No   • Sexual activity: Not on file     Other Topics Concern   • Not on file     Social History Narrative   • No narrative on file       Family History   Problem Relation Age of Onset   • Diabetes Father    • Stroke Paternal Aunt 66        mild   • Cancer Paternal Grandmother 90        pancreatic        History reviewed. No pertinent surgical history.    ROS: As per HPI. Additional pertinent systems as noted below.  Constitutional: Negative for chills and fever.   HENT: Negative for ear pain and sore throat.    Eyes: Negative for discharge and redness.   Respiratory: Negative for cough, hemoptysis, wheezing and stridor.    Cardiovascular: Negative for chest pain, palpitations and leg swelling.   Gastrointestinal: Negative for abdominal pain, constipation, nausea and vomiting. loose stools, occasional heartburn  Genitourinary: Negative for dysuria, flank pain and hematuria.   Musculoskeletal: Negative for falls and myalgias.   Skin: Negative for itching and rash.   Neurological: Negative for dizziness, seizures, loss of consciousness and headaches.   Endo/Heme/Allergies: Negative for polydipsia. Does not bruise/bleed easily.   Psychiatric/Behavioral: Negative for substance abuse and suicidal ideas.       /86 (BP Location: Left arm, Patient Position: Sitting, BP Cuff Size: Adult)   Pulse 73   Temp 36.4 °C (97.6 °F)   Ht 1.867 m (6' 1.5\")   Wt 97 kg (213 lb 12.8 oz)   SpO2 98%   BMI 27.83 kg/m²     Physical Exam  General:  Alert and oriented, No apparent distress.    Eyes: Pupils equal and reactive. No scleral icterus.    Throat: Clear no erythema or exudates noted.    Neck: Supple. No lymphadenopathy noted. " Thyroid not enlarged.    Lungs: Clear to auscultation bilaterally with no wheezing or crackles.    Cardiovascular: Regular rate and rhythm. No murmurs, rubs or gallops.    Abdomen:  Benign. No rebound or guarding noted. No organomegaly. Normal bowel sounds    Extremities: No clubbing, cyanosis, edema.    Skin: Clear. No rash or suspicious skin lesions noted.      Note: I have reviewed all pertinent labs and diagnostic tests associated with this visit with specific comments listed under the assessment and plan below    Assessment and Plan    1. Mild intermittent asthma without complication  Stable controlled with albuterol PRN. Mainly needs it with exercise now and when he gets sick.     2. Diarrhea, unspecified type  Likely food intolerance, but has history of iron deficiency, so possibly celiac disease. Will check CBC and iron studies. If abnormal, can consider celiac testing. In the meantime recommend food diary and removing egg from diet to see if symptoms resolve.   Citrucel for symptomatic relief.   -     CBC WITH DIFFERENTIAL; Future  -     Comp Metabolic Panel; Future  -     TSH WITH REFLEX TO FT4; Future    3. History of iron deficiency  -     IRON/TOTAL IRON BIND; Future  -     FERRITIN; Future    4. Family history of diabetes mellitus  5. BMI 27.0-27.9,adult  -     HEMOGLOBIN A1C; Future    6. Screening for lipid disorders  -     Lipid Profile; Future    Followup: Return in about 8 weeks (around 8/21/2019).    Risk Assessment (discuss potential complications a function of chronic problems): x    Complexity (discuss number of co-morbidities): x    Signed by: Arben Alfaro M.D.

## 2019-06-29 ENCOUNTER — OFFICE VISIT (OUTPATIENT)
Dept: URGENT CARE | Facility: PHYSICIAN GROUP | Age: 39
End: 2019-06-29
Payer: COMMERCIAL

## 2019-06-29 VITALS
WEIGHT: 213 LBS | HEART RATE: 85 BPM | RESPIRATION RATE: 20 BRPM | BODY MASS INDEX: 27.34 KG/M2 | TEMPERATURE: 98.4 F | DIASTOLIC BLOOD PRESSURE: 62 MMHG | HEIGHT: 74 IN | SYSTOLIC BLOOD PRESSURE: 124 MMHG | OXYGEN SATURATION: 100 %

## 2019-06-29 DIAGNOSIS — J45.21 MILD INTERMITTENT ASTHMA WITH ACUTE EXACERBATION: ICD-10-CM

## 2019-06-29 PROCEDURE — 99214 OFFICE O/P EST MOD 30 MIN: CPT | Performed by: PHYSICIAN ASSISTANT

## 2019-06-29 RX ORDER — PREDNISONE 20 MG/1
20 TABLET ORAL DAILY
Qty: 4 TAB | Refills: 0 | Status: SHIPPED | OUTPATIENT
Start: 2019-06-29 | End: 2019-07-03

## 2019-07-02 ASSESSMENT — ENCOUNTER SYMPTOMS
COUGH: 1
EYE PAIN: 0
HEADACHES: 0
DIZZINESS: 0
ABDOMINAL PAIN: 0
CHEST TIGHTNESS: 1
BLURRED VISION: 0
NAUSEA: 0
MYALGIAS: 1
CHILLS: 0
SORE THROAT: 0
SPUTUM PRODUCTION: 0
FEVER: 0
VOMITING: 0
TROUBLE SWALLOWING: 0
SHORTNESS OF BREATH: 1
DIARRHEA: 0
WHEEZING: 1

## 2019-07-02 NOTE — PROGRESS NOTES
Subjective:      Sarabjit Escobar is a 38 y.o. male who presents with Cough (on and off x 3 weeks, wheezing, SOB, chills, body aches)      Asthma   He complains of chest tightness, cough, shortness of breath and wheezing. There is no sputum production. Chronicity: He has been noticing it for the past 3 weeks but got much worse over the past 2-3 days when he got sick with a URI. The current episode started 1 to 4 weeks ago. The problem occurs intermittently. The problem has been waxing and waning. The cough is non-productive. Associated symptoms include malaise/fatigue, myalgias and nasal congestion. Pertinent negatives include no chest pain, fever, headaches, postnasal drip, sneezing, sore throat or trouble swallowing. His symptoms are aggravated by strenuous activity. His symptoms are alleviated by beta-agonist. He reports moderate improvement on treatment. His past medical history is significant for asthma.       Review of Systems   Constitutional: Positive for malaise/fatigue. Negative for chills and fever.   HENT: Positive for congestion. Negative for postnasal drip, sneezing, sore throat and trouble swallowing.    Eyes: Negative for blurred vision and pain.   Respiratory: Positive for cough, shortness of breath and wheezing. Negative for sputum production.    Cardiovascular: Negative for chest pain.   Gastrointestinal: Negative for abdominal pain, diarrhea, nausea and vomiting.   Musculoskeletal: Positive for myalgias.   Skin: Negative for rash.   Neurological: Negative for dizziness and headaches.       PMH:  has no past medical history on file.  MEDS:   Current Outpatient Prescriptions:   •  predniSONE (DELTASONE) 20 MG Tab, Take 1 Tab by mouth every day for 4 days., Disp: 4 Tab, Rfl: 0  •  VENTOLIN  (90 Base) MCG/ACT Aero Soln inhalation aerosol, , Disp: , Rfl:   ALLERGIES: No Known Allergies  SURGHX: History reviewed. No pertinent surgical history.  SOCHX:  reports that he has never smoked.  "He has never used smokeless tobacco. He reports that he drinks about 4.2 oz of alcohol per week . He reports that he does not use drugs.  FH: Family history was reviewed, no pertinent findings to report     Objective:     /62 (BP Location: Left arm, Patient Position: Sitting, BP Cuff Size: Adult)   Pulse 85   Temp 36.9 °C (98.4 °F) (Temporal)   Resp 20   Ht 1.867 m (6' 1.5\")   Wt 96.6 kg (213 lb)   SpO2 100%   BMI 27.72 kg/m²      Physical Exam   Constitutional: He is oriented to person, place, and time. He appears well-developed and well-nourished.   HENT:   Head: Normocephalic and atraumatic.   Right Ear: Tympanic membrane, external ear and ear canal normal.   Left Ear: Tympanic membrane, external ear and ear canal normal.   Nose: Nose normal. Right sinus exhibits no maxillary sinus tenderness and no frontal sinus tenderness. Left sinus exhibits no maxillary sinus tenderness and no frontal sinus tenderness.   Mouth/Throat: Uvula is midline, oropharynx is clear and moist and mucous membranes are normal.   Eyes: Pupils are equal, round, and reactive to light. Conjunctivae are normal.   Neck: Normal range of motion.   Cardiovascular: Normal rate, regular rhythm and normal heart sounds.    No murmur heard.  Pulmonary/Chest: Effort normal. No accessory muscle usage. No respiratory distress. He has wheezes (end expiratory wheezes).   Lymphadenopathy:     He has no cervical adenopathy.   Neurological: He is alert and oriented to person, place, and time.   Skin: Skin is warm and dry. Capillary refill takes less than 2 seconds.   Psychiatric: He has a normal mood and affect. His behavior is normal. Judgment normal.   Vitals reviewed.         Assessment/Plan:     1. Mild intermittent asthma with acute exacerbation  - predniSONE (DELTASONE) 20 MG Tab; Take 1 Tab by mouth every day for 4 days.  Dispense: 4 Tab; Refill: 0  - Follow up with PCP      Differential Diagnosis, natural history, and supportive care " discussed. Return to the Urgent Care or follow up with your PCP if symptoms fail to resolve, or for any new or worsening symptoms. Emergency room precautions discussed. Patient and/or family appears understanding of information.

## 2019-07-11 ENCOUNTER — HOSPITAL ENCOUNTER (OUTPATIENT)
Dept: LAB | Facility: MEDICAL CENTER | Age: 39
End: 2019-07-11
Attending: STUDENT IN AN ORGANIZED HEALTH CARE EDUCATION/TRAINING PROGRAM
Payer: COMMERCIAL

## 2019-07-11 DIAGNOSIS — Z86.39 HISTORY OF IRON DEFICIENCY: ICD-10-CM

## 2019-07-11 DIAGNOSIS — Z13.220 SCREENING FOR LIPID DISORDERS: ICD-10-CM

## 2019-07-11 DIAGNOSIS — R19.7 DIARRHEA, UNSPECIFIED TYPE: ICD-10-CM

## 2019-07-11 DIAGNOSIS — Z83.3 FAMILY HISTORY OF DIABETES MELLITUS: ICD-10-CM

## 2019-07-11 LAB
ALBUMIN SERPL BCP-MCNC: 4.5 G/DL (ref 3.2–4.9)
ALBUMIN/GLOB SERPL: 1.6 G/DL
ALP SERPL-CCNC: 46 U/L (ref 30–99)
ALT SERPL-CCNC: 15 U/L (ref 2–50)
ANION GAP SERPL CALC-SCNC: 9 MMOL/L (ref 0–11.9)
AST SERPL-CCNC: 16 U/L (ref 12–45)
BASOPHILS # BLD AUTO: 0.8 % (ref 0–1.8)
BASOPHILS # BLD: 0.03 K/UL (ref 0–0.12)
BILIRUB SERPL-MCNC: 1.4 MG/DL (ref 0.1–1.5)
BUN SERPL-MCNC: 14 MG/DL (ref 8–22)
CALCIUM SERPL-MCNC: 9.7 MG/DL (ref 8.5–10.5)
CHLORIDE SERPL-SCNC: 104 MMOL/L (ref 96–112)
CHOLEST SERPL-MCNC: 191 MG/DL (ref 100–199)
CO2 SERPL-SCNC: 28 MMOL/L (ref 20–33)
CREAT SERPL-MCNC: 0.93 MG/DL (ref 0.5–1.4)
EOSINOPHIL # BLD AUTO: 0.1 K/UL (ref 0–0.51)
EOSINOPHIL NFR BLD: 2.7 % (ref 0–6.9)
ERYTHROCYTE [DISTWIDTH] IN BLOOD BY AUTOMATED COUNT: 39.9 FL (ref 35.9–50)
EST. AVERAGE GLUCOSE BLD GHB EST-MCNC: 111 MG/DL
FASTING STATUS PATIENT QL REPORTED: NORMAL
FERRITIN SERPL-MCNC: 82 NG/ML (ref 22–322)
GLOBULIN SER CALC-MCNC: 2.8 G/DL (ref 1.9–3.5)
GLUCOSE SERPL-MCNC: 86 MG/DL (ref 65–99)
HBA1C MFR BLD: 5.5 % (ref 0–5.6)
HCT VFR BLD AUTO: 46.5 % (ref 42–52)
HDLC SERPL-MCNC: 48 MG/DL
HGB BLD-MCNC: 15.7 G/DL (ref 14–18)
IMM GRANULOCYTES # BLD AUTO: 0.01 K/UL (ref 0–0.11)
IMM GRANULOCYTES NFR BLD AUTO: 0.3 % (ref 0–0.9)
IRON SATN MFR SERPL: 55 % (ref 15–55)
IRON SERPL-MCNC: 182 UG/DL (ref 50–180)
LDLC SERPL CALC-MCNC: 119 MG/DL
LYMPHOCYTES # BLD AUTO: 0.96 K/UL (ref 1–4.8)
LYMPHOCYTES NFR BLD: 25.9 % (ref 22–41)
MCH RBC QN AUTO: 30.4 PG (ref 27–33)
MCHC RBC AUTO-ENTMCNC: 33.8 G/DL (ref 33.7–35.3)
MCV RBC AUTO: 89.9 FL (ref 81.4–97.8)
MONOCYTES # BLD AUTO: 0.38 K/UL (ref 0–0.85)
MONOCYTES NFR BLD AUTO: 10.3 % (ref 0–13.4)
NEUTROPHILS # BLD AUTO: 2.22 K/UL (ref 1.82–7.42)
NEUTROPHILS NFR BLD: 60 % (ref 44–72)
NRBC # BLD AUTO: 0 K/UL
NRBC BLD-RTO: 0 /100 WBC
PLATELET # BLD AUTO: 172 K/UL (ref 164–446)
PMV BLD AUTO: 11.5 FL (ref 9–12.9)
POTASSIUM SERPL-SCNC: 3.9 MMOL/L (ref 3.6–5.5)
PROT SERPL-MCNC: 7.3 G/DL (ref 6–8.2)
RBC # BLD AUTO: 5.17 M/UL (ref 4.7–6.1)
SODIUM SERPL-SCNC: 141 MMOL/L (ref 135–145)
TIBC SERPL-MCNC: 333 UG/DL (ref 250–450)
TRIGL SERPL-MCNC: 122 MG/DL (ref 0–149)
TSH SERPL DL<=0.005 MIU/L-ACNC: 1.63 UIU/ML (ref 0.38–5.33)
WBC # BLD AUTO: 3.7 K/UL (ref 4.8–10.8)

## 2019-07-11 PROCEDURE — 83036 HEMOGLOBIN GLYCOSYLATED A1C: CPT

## 2019-07-11 PROCEDURE — 80053 COMPREHEN METABOLIC PANEL: CPT

## 2019-07-11 PROCEDURE — 85025 COMPLETE CBC W/AUTO DIFF WBC: CPT

## 2019-07-11 PROCEDURE — 82728 ASSAY OF FERRITIN: CPT

## 2019-07-11 PROCEDURE — 80061 LIPID PANEL: CPT

## 2019-07-11 PROCEDURE — 83550 IRON BINDING TEST: CPT

## 2019-07-11 PROCEDURE — 84443 ASSAY THYROID STIM HORMONE: CPT

## 2019-07-11 PROCEDURE — 36415 COLL VENOUS BLD VENIPUNCTURE: CPT

## 2019-07-11 PROCEDURE — 83540 ASSAY OF IRON: CPT

## 2019-08-03 ENCOUNTER — OFFICE VISIT (OUTPATIENT)
Dept: URGENT CARE | Facility: PHYSICIAN GROUP | Age: 39
End: 2019-08-03
Payer: COMMERCIAL

## 2019-08-03 ENCOUNTER — HOSPITAL ENCOUNTER (OUTPATIENT)
Dept: RADIOLOGY | Facility: MEDICAL CENTER | Age: 39
End: 2019-08-03
Attending: NURSE PRACTITIONER
Payer: COMMERCIAL

## 2019-08-03 VITALS
WEIGHT: 211 LBS | HEIGHT: 74 IN | SYSTOLIC BLOOD PRESSURE: 140 MMHG | DIASTOLIC BLOOD PRESSURE: 62 MMHG | OXYGEN SATURATION: 97 % | TEMPERATURE: 99.8 F | HEART RATE: 112 BPM | BODY MASS INDEX: 27.08 KG/M2

## 2019-08-03 DIAGNOSIS — R05.9 COUGH: ICD-10-CM

## 2019-08-03 DIAGNOSIS — R06.02 SHORTNESS OF BREATH: ICD-10-CM

## 2019-08-03 DIAGNOSIS — R50.9 FEVER, UNSPECIFIED FEVER CAUSE: ICD-10-CM

## 2019-08-03 DIAGNOSIS — J45.21 MILD INTERMITTENT ASTHMA WITH ACUTE EXACERBATION: ICD-10-CM

## 2019-08-03 DIAGNOSIS — R06.2 WHEEZING: ICD-10-CM

## 2019-08-03 LAB
FLUAV+FLUBV AG SPEC QL IA: NEGATIVE
INT CON NEG: NEGATIVE
INT CON POS: POSITIVE

## 2019-08-03 PROCEDURE — 71046 X-RAY EXAM CHEST 2 VIEWS: CPT

## 2019-08-03 PROCEDURE — 99214 OFFICE O/P EST MOD 30 MIN: CPT | Performed by: NURSE PRACTITIONER

## 2019-08-03 PROCEDURE — 87804 INFLUENZA ASSAY W/OPTIC: CPT | Performed by: NURSE PRACTITIONER

## 2019-08-03 RX ORDER — METHYLPREDNISOLONE SODIUM SUCCINATE 125 MG/2ML
125 INJECTION, POWDER, LYOPHILIZED, FOR SOLUTION INTRAMUSCULAR; INTRAVENOUS ONCE
Status: DISCONTINUED | OUTPATIENT
Start: 2019-08-03 | End: 2019-08-03

## 2019-08-03 RX ORDER — PREDNISONE 20 MG/1
20 TABLET ORAL DAILY
Qty: 5 TAB | Refills: 0 | Status: SHIPPED | OUTPATIENT
Start: 2019-08-03 | End: 2019-08-08

## 2019-08-03 RX ORDER — IPRATROPIUM BROMIDE AND ALBUTEROL SULFATE 2.5; .5 MG/3ML; MG/3ML
3 SOLUTION RESPIRATORY (INHALATION) ONCE
Status: COMPLETED | OUTPATIENT
Start: 2019-08-03 | End: 2019-08-03

## 2019-08-03 RX ADMIN — IPRATROPIUM BROMIDE AND ALBUTEROL SULFATE 3 ML: 2.5; .5 SOLUTION RESPIRATORY (INHALATION) at 16:35

## 2019-08-03 ASSESSMENT — ENCOUNTER SYMPTOMS
SHORTNESS OF BREATH: 1
CHILLS: 1
SPUTUM PRODUCTION: 1
FEVER: 1
NEUROLOGICAL NEGATIVE: 1
COUGH: 1
WHEEZING: 1
CARDIOVASCULAR NEGATIVE: 1

## 2019-08-03 NOTE — PROGRESS NOTES
"Subjective:     Sarabjit Escobar is a 38 y.o. male who presents for Asthma (x3 days, fever, hard time breathing, current inhaler not helping)       Cough   This is a new problem. Episode onset: 2 days ago. The problem has been gradually worsening. The cough is productive of sputum. Associated symptoms include chills, a fever, shortness of breath and wheezing. He has tried a beta-agonist inhaler for the symptoms. The treatment provided no relief. His past medical history is significant for asthma.     PMH:  has no past medical history on file.    MEDS:   Current Outpatient Medications:   •  predniSONE (DELTASONE) 20 MG Tab, Take 1 Tab by mouth every day for 5 days., Disp: 5 Tab, Rfl: 0  •  VENTOLIN  (90 Base) MCG/ACT Aero Soln inhalation aerosol, , Disp: , Rfl:     Current Facility-Administered Medications:   •  ipratropium-albuterol (DUONEB) nebulizer solution, 3 mL, Nebulization, Once, Kermit Wilburn, A.P.R.N.    ALLERGIES: No Known Allergies    SURGHX: History reviewed. No pertinent surgical history.    SOCHX:  reports that he has never smoked. He has never used smokeless tobacco. He reports that he drinks about 4.2 oz of alcohol per week. He reports that he does not use drugs.     FH: Reviewed with patient, not pertinent to this visit.    Review of Systems   Constitutional: Positive for chills, fever and malaise/fatigue.   HENT: Positive for congestion.    Respiratory: Positive for cough, sputum production, shortness of breath and wheezing.    Cardiovascular: Negative.    Neurological: Negative.    All other systems reviewed and are negative.    Objective:     /62 (BP Location: Right arm, Patient Position: Sitting, BP Cuff Size: Adult)   Pulse (!) 112   Temp 37.7 °C (99.8 °F) (Temporal)   Ht 1.867 m (6' 1.5\")   Wt 95.7 kg (211 lb)   SpO2 97%   BMI 27.46 kg/m²     Physical Exam   Constitutional: He is oriented to person, place, and time. He appears well-developed and " well-nourished. He is cooperative.  Non-toxic appearance. No distress.   HENT:   Right Ear: Tympanic membrane and external ear normal.   Left Ear: Tympanic membrane and external ear normal.   Nose: Nose normal.   Mouth/Throat: Uvula is midline, oropharynx is clear and moist and mucous membranes are normal.   Eyes: Pupils are equal, round, and reactive to light. Conjunctivae and EOM are normal.   Neck: Normal range of motion.   Cardiovascular: Regular rhythm, normal heart sounds and normal pulses. Tachycardia present.   Pulmonary/Chest: Effort normal. Tachypnea noted. No respiratory distress. He has no decreased breath sounds. He has wheezes.   Abdominal: Soft. Bowel sounds are normal. There is no tenderness.   Musculoskeletal: Normal range of motion. He exhibits no deformity.   Lymphadenopathy:     He has no cervical adenopathy.   Neurological: He is alert and oriented to person, place, and time. He has normal strength. No sensory deficit.   Skin: Skin is warm, dry and intact. Capillary refill takes less than 2 seconds.   Psychiatric: He has a normal mood and affect. His behavior is normal.   Vitals reviewed.    Influenza A/B swab: negative    X-ray chest:    Details     Reading Physician Reading Date Result Priority   Mitchel Zepeda M.D. 8/3/2019 Urgent [facility]      Narrative       8/3/2019 4:11 PM    HISTORY/REASON FOR EXAM:  Cough  Fever.    TECHNIQUE/EXAM DESCRIPTION AND NUMBER OF VIEWS:  Two views of the chest.    COMPARISON:  None.    FINDINGS:    No pulmonary infiltrates or consolidations are noted.  No pleural effusions, no pneumothorax are appreciated.  Normal cardiopericardial silhouette.        Impression         1. No active cardiopulmonary abnormalities are identified.             Last Resulted: 08/03/19  4:13 PM           Assessment/Plan:     1. Mild intermittent asthma with acute exacerbation  - ipratropium-albuterol (DUONEB) nebulizer solution  - predniSONE (DELTASONE) 20 MG Tab; Take 1 Tab by mouth  every day for 5 days.  Dispense: 5 Tab; Refill: 0    2. Cough  - POCT Influenza A/B  - DX-CHEST-2 VIEWS; Future    3. Fever, unspecified fever cause  - POCT Influenza A/B  - DX-CHEST-2 VIEWS; Future    4. Wheezing    5. Shortness of breath    DuoNeb given in clinic. Lung sounds improved. Patient reports less SOB.    X-ray of chest ordered. Radiology report and images reviewed by myself. Negative for acute cardiopulmonary abnormalities.    Flu A/B swab negative.    Discussed likely viral etiology and expected course and duration of illness.    Patient reports he still has non- albuterol inhaler available.    Temp 99.8 F, . Discussed close monitoring and supportive measures including increasing fluids and rest as well as OTC symptom management including acetaminophen and/or ibuprofen PRN pain and/or fever.    Warning signs reviewed. Strict return/ER precautions advised.    Patient advised to: Return for 1) Symptoms don't improve or worsen, or go to ER, 2) Follow up with primary care in 7-10 days.    Differential diagnosis, natural history, supportive care, and indications for immediate follow-up discussed. All questions answered. Patient agrees with the plan of care.

## 2019-08-11 ENCOUNTER — OFFICE VISIT (OUTPATIENT)
Dept: URGENT CARE | Facility: PHYSICIAN GROUP | Age: 39
End: 2019-08-11
Payer: COMMERCIAL

## 2019-08-11 VITALS
HEIGHT: 74 IN | SYSTOLIC BLOOD PRESSURE: 124 MMHG | HEART RATE: 88 BPM | OXYGEN SATURATION: 96 % | BODY MASS INDEX: 26.82 KG/M2 | TEMPERATURE: 98.7 F | RESPIRATION RATE: 18 BRPM | DIASTOLIC BLOOD PRESSURE: 82 MMHG | WEIGHT: 209 LBS

## 2019-08-11 DIAGNOSIS — J45.21 MILD INTERMITTENT ASTHMA WITH ACUTE EXACERBATION: ICD-10-CM

## 2019-08-11 PROCEDURE — 99213 OFFICE O/P EST LOW 20 MIN: CPT | Performed by: FAMILY MEDICINE

## 2019-08-11 RX ORDER — IPRATROPIUM BROMIDE AND ALBUTEROL SULFATE 2.5; .5 MG/3ML; MG/3ML
3 SOLUTION RESPIRATORY (INHALATION) ONCE
Status: COMPLETED | OUTPATIENT
Start: 2019-08-11 | End: 2019-08-11

## 2019-08-11 RX ORDER — PREDNISONE 20 MG/1
TABLET ORAL
Qty: 11 TAB | Refills: 0 | Status: SHIPPED | OUTPATIENT
Start: 2019-08-11 | End: 2019-11-06

## 2019-08-11 RX ADMIN — IPRATROPIUM BROMIDE AND ALBUTEROL SULFATE 3 ML: 2.5; .5 SOLUTION RESPIRATORY (INHALATION) at 12:31

## 2019-08-11 ASSESSMENT — ENCOUNTER SYMPTOMS
WHEEZING: 1
SHORTNESS OF BREATH: 1
FEVER: 0

## 2019-08-11 NOTE — PROGRESS NOTES
"Subjective:     Sarabjit Escobar is a 38 y.o. male who presents for Asthma (asthma issues)    HPI  Pt presents for asthma problems and wheezing   Pt was evaluated 1 week ago for this and started on prednisone   Feels that it helped him greatly   The past 2 days, starting to bother him again more   Pt chronically uses his inhaler very rarely   Wheezing more, having troubles getting full breaths, and using inhaler a few times a day  When he does use his inhaler it helps greatly    Review of Systems   Constitutional: Negative for fever.   Respiratory: Positive for shortness of breath and wheezing.    Cardiovascular: Negative for chest pain.   Skin: Negative for rash.       PMH: Hx of asthma   MEDS:   Current Outpatient Medications:   •  albuterol (PROVENTIL) 2.5 mg/0.5 mL Nebu Soln, by Nebulization route ONCE (RT)., Disp: , Rfl:   •  VENTOLIN  (90 Base) MCG/ACT Aero Soln inhalation aerosol, , Disp: , Rfl:   ALLERGIES: No Known Allergies  SURGHX: No past surgical history on file.  SOCHX:  reports that he has never smoked. He has never used smokeless tobacco. He reports that he drinks about 4.2 oz of alcohol per week. He reports that he does not use drugs.     Objective:   /82 (BP Location: Right arm, Patient Position: Sitting, BP Cuff Size: Small adult)   Pulse 88   Temp 37.1 °C (98.7 °F) (Temporal)   Resp 18   Ht 1.88 m (6' 2\")   Wt 94.8 kg (209 lb)   SpO2 96%   BMI 26.83 kg/m²     Physical Exam   Constitutional: He appears well-developed and well-nourished. No distress.   HENT:   Head: Normocephalic and atraumatic.   Neck: Normal range of motion. No tracheal deviation present.   Cardiovascular: Normal rate, regular rhythm and normal heart sounds.   Pulmonary/Chest:   Decreased air movement throughout, expiratory wheezes throughout, no focal rales appreciated, breathing is borderline tachypneic   Musculoskeletal: Normal range of motion.   Neurological: He is alert.   Skin: Skin is warm and " dry. No rash noted. He is not diaphoretic.   Psychiatric: He has a normal mood and affect. His behavior is normal. Judgment and thought content normal.     Assessment/Plan:   Assessment    1. Mild intermittent asthma with acute exacerbation  Patient is a 38-year-old male with asthma exacerbation.  Was previously treated with 5-day course of steroids which did help, but symptoms returned as soon as he stopped medication.  Will repeat course for longer period in order to fully resolve his symptoms.  Given DuoNeb treatment in office which helped his symptoms.  Follow-up as needed.  - ipratropium-albuterol (DUONEB) nebulizer solution  - predniSONE (DELTASONE) 20 MG Tab; Take 2 tabs (40mg) daily x 3 days, then take 1 tab (20mg) daily x 3 days, then take 1/2 tab (10mg) daily x 4 days  Dispense: 11 Tab; Refill: 0

## 2019-08-21 ENCOUNTER — TELEPHONE (OUTPATIENT)
Dept: SCHEDULING | Facility: IMAGING CENTER | Age: 39
End: 2019-08-21

## 2019-08-25 RX ORDER — ALBUTEROL SULFATE 90 UG/1
1-2 AEROSOL, METERED RESPIRATORY (INHALATION) EVERY 6 HOURS PRN
Qty: 8.5 G | Refills: 3 | Status: SHIPPED | OUTPATIENT
Start: 2019-08-25 | End: 2019-09-08

## 2019-08-25 NOTE — PROGRESS NOTES
-Answer west paged 8/25/2019 ~8am.  Patient requesting refill on ventolin.  -Ventolin order placed  -Patient notified.

## 2019-09-08 ENCOUNTER — OFFICE VISIT (OUTPATIENT)
Dept: URGENT CARE | Facility: PHYSICIAN GROUP | Age: 39
End: 2019-09-08
Payer: COMMERCIAL

## 2019-09-08 VITALS
OXYGEN SATURATION: 97 % | DIASTOLIC BLOOD PRESSURE: 84 MMHG | BODY MASS INDEX: 28.31 KG/M2 | WEIGHT: 209 LBS | TEMPERATURE: 97.9 F | HEART RATE: 112 BPM | SYSTOLIC BLOOD PRESSURE: 122 MMHG | HEIGHT: 72 IN | RESPIRATION RATE: 16 BRPM

## 2019-09-08 DIAGNOSIS — J45.21 MILD INTERMITTENT ASTHMA WITH ACUTE EXACERBATION: Primary | ICD-10-CM

## 2019-09-08 DIAGNOSIS — J30.2 SEASONAL ALLERGIES: ICD-10-CM

## 2019-09-08 DIAGNOSIS — J98.01 ACUTE BRONCHOSPASM: ICD-10-CM

## 2019-09-08 PROCEDURE — 99214 OFFICE O/P EST MOD 30 MIN: CPT | Performed by: PHYSICIAN ASSISTANT

## 2019-09-08 RX ORDER — PREDNISONE 20 MG/1
20 TABLET ORAL DAILY
Qty: 7 TAB | Refills: 0 | Status: SHIPPED | OUTPATIENT
Start: 2019-09-08 | End: 2019-09-15

## 2019-09-08 RX ORDER — IPRATROPIUM BROMIDE AND ALBUTEROL SULFATE 2.5; .5 MG/3ML; MG/3ML
3 SOLUTION RESPIRATORY (INHALATION) ONCE
Status: COMPLETED | OUTPATIENT
Start: 2019-09-08 | End: 2019-09-08

## 2019-09-08 RX ORDER — ALBUTEROL SULFATE 90 UG/1
2 AEROSOL, METERED RESPIRATORY (INHALATION) EVERY 4 HOURS PRN
Qty: 1 INHALER | Refills: 0 | Status: SHIPPED | OUTPATIENT
Start: 2019-09-08 | End: 2020-03-06 | Stop reason: SDUPTHER

## 2019-09-08 RX ORDER — MONTELUKAST SODIUM 10 MG/1
10 TABLET ORAL DAILY
Qty: 30 TAB | Refills: 2 | Status: SHIPPED | OUTPATIENT
Start: 2019-09-08 | End: 2019-11-06

## 2019-09-08 RX ORDER — IPRATROPIUM BROMIDE AND ALBUTEROL SULFATE 2.5; .5 MG/3ML; MG/3ML
3 SOLUTION RESPIRATORY (INHALATION) EVERY 6 HOURS PRN
Qty: 90 ML | Refills: 0 | Status: SHIPPED | OUTPATIENT
Start: 2019-09-08 | End: 2020-01-03 | Stop reason: SDUPTHER

## 2019-09-08 RX ADMIN — IPRATROPIUM BROMIDE AND ALBUTEROL SULFATE 3 ML: 2.5; .5 SOLUTION RESPIRATORY (INHALATION) at 11:15

## 2019-09-08 ASSESSMENT — ENCOUNTER SYMPTOMS: COUGH: 1
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
## 2019-09-09 ASSESSMENT — ENCOUNTER SYMPTOMS
SPUTUM PRODUCTION: 0
HEADACHES: 1
SHORTNESS OF BREATH: 1
SORE THROAT: 0
FEVER: 0
HEMOPTYSIS: 0
RHINORRHEA: 1
WHEEZING: 1

## 2019-09-18 ENCOUNTER — NON-PROVIDER VISIT (OUTPATIENT)
Dept: PULMONOLOGY | Facility: HOSPICE | Age: 39
End: 2019-09-18
Payer: COMMERCIAL

## 2019-09-18 ENCOUNTER — OFFICE VISIT (OUTPATIENT)
Dept: PULMONOLOGY | Facility: HOSPICE | Age: 39
End: 2019-09-18
Payer: COMMERCIAL

## 2019-09-18 VITALS
BODY MASS INDEX: 28.79 KG/M2 | SYSTOLIC BLOOD PRESSURE: 130 MMHG | DIASTOLIC BLOOD PRESSURE: 88 MMHG | OXYGEN SATURATION: 98 % | HEIGHT: 73 IN | HEART RATE: 63 BPM | WEIGHT: 217.25 LBS

## 2019-09-18 VITALS — BODY MASS INDEX: 28.63 KG/M2 | WEIGHT: 217 LBS

## 2019-09-18 DIAGNOSIS — R06.09 OTHER FORM OF DYSPNEA: ICD-10-CM

## 2019-09-18 DIAGNOSIS — J45.30 MILD PERSISTENT ASTHMA WITHOUT COMPLICATION: ICD-10-CM

## 2019-09-18 PROCEDURE — 99204 OFFICE O/P NEW MOD 45 MIN: CPT | Mod: 25 | Performed by: INTERNAL MEDICINE

## 2019-09-18 PROCEDURE — 94010 BREATHING CAPACITY TEST: CPT | Performed by: INTERNAL MEDICINE

## 2019-09-18 SDOH — HEALTH STABILITY: MENTAL HEALTH: HOW MANY STANDARD DRINKS CONTAINING ALCOHOL DO YOU HAVE ON A TYPICAL DAY?: 1 OR 2

## 2019-09-18 SDOH — HEALTH STABILITY: MENTAL HEALTH: HOW OFTEN DO YOU HAVE A DRINK CONTAINING ALCOHOL?: 2-3 TIMES A WEEK

## 2019-09-18 ASSESSMENT — PULMONARY FUNCTION TESTS
FVC: 6.36
FVC_PERCENT_PREDICTED: 108
FEV1_PREDICTED: 4.66
FEV1/FVC_PREDICTED: 79.52
FEV1_PREDICTED: 107
FVC_PREDICTED: 5.86
FEV1/FVC_PERCENT_PREDICTED: 99
FEV1/FVC: 78
FEV1: 4.99

## 2019-09-18 ASSESSMENT — ENCOUNTER SYMPTOMS
SHORTNESS OF BREATH: 1
SPUTUM PRODUCTION: 1
PSYCHIATRIC NEGATIVE: 1
COUGH: 1
MUSCULOSKELETAL NEGATIVE: 1
CONSTITUTIONAL NEGATIVE: 1
CARDIOVASCULAR NEGATIVE: 1
EYES NEGATIVE: 1
NEUROLOGICAL NEGATIVE: 1
GASTROINTESTINAL NEGATIVE: 1

## 2019-09-18 NOTE — PROCEDURES
Acceptable and reproducible  FEV1 4.99 L] 107%], FVC 6.36 L] 70%], ratio 78%  FLow volume loops slight concavity of the expiratory loop suggestive of small airway obstruction    Impression  Normal spirometry

## 2019-09-18 NOTE — ASSESSMENT & PLAN NOTE
Has history of asthma and worse since moving to [place].  Do agree that he needs to be on inhaled corticosteroid.  Changed him to Asmanex HFA.    Reviewed asthma and asthma management.  Reviewed medications for asthma  At this time would have him stop the Flonase, Allegra, and Singulair as a like to see if once his asthma is better controlled if his symptoms are better controlled.  Reviewed asthma behavioral management  Patient will be buying hypoallergenic pillows  Educated him on cold dry air and preventing exercise-induced bronchoconstriction  Will order an IgE and allergy profile  Follow-up in 1 month

## 2019-09-18 NOTE — PROGRESS NOTES
Pulmonary Consultation    Date of Service: 9/18/2019    Consulting Physician: Pcp Pt States None    Reason for consult:  Establish Care (Referred by ARLETTE [facility] for Acute Bronchospasm/Mild intermittent asthma with acute exacerbation/seasonal allergies) and Other (Labs 07/2019,  CXR 08/03/19)      Problem List Items Addressed This Visit     Mild persistent asthma without complication     Has history of asthma and worse since moving to [place].  Do agree that he needs to be on inhaled corticosteroid.  Changed him to Asmanex HFA.    Reviewed asthma and asthma management.  Reviewed medications for asthma  At this time would have him stop the Flonase, Allegra, and Singulair as a like to see if once his asthma is better controlled if his symptoms are better controlled.  Reviewed asthma behavioral management  Patient will be buying hypoallergenic pillows  Educated him on cold dry air and preventing exercise-induced bronchoconstriction  Will order an IgE and allergy profile  Follow-up in 1 month           Relevant Medications    Mometasone Furoate (ASMANEX HFA) 100 MCG/ACT Aerosol    Other Relevant Orders    IGE+ALLERGENS ZONE 15(26)      Other Visit Diagnoses     Other form of dyspnea        Relevant Orders    Spirometry (Completed)    IGE+ALLERGENS ZONE 15(26)            History of Present Illness: Sarabjit Escobar is a 38 y.o. male with a past medical history of asthma as a child but then improved to the point able to run half marathons and only had a few times in the last 15 years to this past year only needed transient maintenance therapy  Moved from [facility] to [facility] last year  October last year symptoms started with progressive asthma symptoms and then in August again needing prednisone  Is now on asmanex and singulair === feels drastically better now and this is past two weeks  Still coughing   A few weeks ago went to [facility] and felt better    Has a humidifier -40-50%  Has a mattress cover  Has  a carpet where you live  Uses a down comforter    Exercise tolerance:  Walking distance: no issues till recently  [place]:no issues    Night time symptoms:+ asthma symptoms    Pulmonary History:    Environmental exposures: no hot tub; no woodstove, no mining work, and no asbestos exposure    Pets: cats  Occupation History: at [employer] -- does not handle chemicals  Family history of pulmonary disease: none  Second hand smoke exposure: no    Review of Systems   Constitutional: Negative.    Eyes: Negative.    Respiratory: Positive for cough, sputum production and shortness of breath.    Cardiovascular: Negative.    Gastrointestinal: Negative.    Genitourinary: Negative.    Musculoskeletal: Negative.    Skin: Negative.    Neurological: Negative.    Endo/Heme/Allergies: Negative.    Psychiatric/Behavioral: Negative.        Current Outpatient Medications on File Prior to Visit   Medication Sig Dispense Refill   • Fexofenadine HCl (ALLEGRA ALLERGY PO) Take 1 Tab by mouth every day.     • Fluticasone Propionate (FLONASE NA) Spray 1 Spray in nose every day. Alternating nostrils to prevent nose bleeds     • albuterol 108 (90 Base) MCG/ACT Aero Soln inhalation aerosol Inhale 2 Puffs by mouth every four hours as needed. 1 Inhaler 0   • ipratropium-albuterol (DUONEB) 0.5-2.5 (3) MG/3ML nebulizer solution 3 mL by Nebulization route every 6 hours as needed for Shortness of Breath for up to 30 doses. 90 mL 0   • montelukast (SINGULAIR) 10 MG Tab Take 1 Tab by mouth every day for 90 days. 30 Tab 2   • predniSONE (DELTASONE) 20 MG Tab Take 2 tabs (40mg) daily x 3 days, then take 1 tab (20mg) daily x 3 days, then take 1/2 tab (10mg) daily x 4 days 11 Tab 0     No current facility-administered medications on file prior to visit.        Social History     Tobacco Use   • Smoking status: Never Smoker   • Smokeless tobacco: Never Used   Substance Use Topics   • Alcohol use: Yes     Alcohol/week: 4.2 oz     Types: 5 Cans of  "beer, 2 Shots of liquor per week     Frequency: 2-3 times a week     Drinks per session: 1 or 2   • Drug use: No        Past Medical History:   Diagnosis Date   • Shortness of breath    • Wheezing        History reviewed. No pertinent surgical history.    Allergies: Patient has no known allergies.    Family History   Problem Relation Age of Onset   • Diabetes Father    • Stroke Paternal Aunt 66        mild   • Cancer Paternal Grandmother 90        pancreatic        Vitals:    09/18/19 0854   Height: 1.854 m (6' 1\")   Weight: 98.5 kg (217 lb 4 oz)   Weight % change since last entry.: 0 %   BP: 130/88   Pulse: 63   BMI (Calculated): 28.66   O2 sat % room air: 98 %       Physical Examination  Physical Exam   Constitutional: He is oriented to person, place, and time and well-developed, well-nourished, and in no distress. No distress.   HENT:   Head: Normocephalic and atraumatic.   Right Ear: External ear normal.   Left Ear: External ear normal.   Nose: Nose normal.   Mouth/Throat: Oropharynx is clear and moist. No oropharyngeal exudate.   Eyes: Pupils are equal, round, and reactive to light. Conjunctivae and EOM are normal.   Neck: Normal range of motion. Neck supple. No JVD present. No tracheal deviation present. No thyromegaly present.   Cardiovascular: Normal rate, regular rhythm, normal heart sounds and intact distal pulses. Exam reveals no gallop and no friction rub.   No murmur heard.  Pulmonary/Chest: Effort normal and breath sounds normal. No stridor. No respiratory distress. He has no wheezes. He has no rales. He exhibits no tenderness.   Abdominal: Soft. Bowel sounds are normal. He exhibits no distension and no mass.   Musculoskeletal: Normal range of motion. He exhibits no edema or deformity.   Lymphadenopathy:     He has no cervical adenopathy.   Neurological: He is alert and oriented to person, place, and time. No cranial nerve deficit. He exhibits normal muscle tone. Gait normal. Coordination normal. GCS " score is 15.   Skin: No rash noted. He is not diaphoretic.   Psychiatric: Mood, memory, affect and judgment normal.     Spirometry in clinic   Acceptable and reproducible  FEV1 4.99 L] 107%], FVC 6.36 L] 70%], ratio 78%  FLow volume loops slight concavity of the expiratory loop suggestive of small airway obstruction    Impression  Normal spirometry    Zehra Crespo MD MPH [facility]  [facility]

## 2019-09-18 NOTE — PROCEDURES
Technician: Manuel Bowles notes:  Good patient effort and cooperation. ATS standards met for reproducibility and acceptability. Predicted set is N-HanesIII.       Interpretation:

## 2019-09-22 ENCOUNTER — OFFICE VISIT (OUTPATIENT)
Dept: URGENT CARE | Facility: PHYSICIAN GROUP | Age: 39
End: 2019-09-22
Payer: COMMERCIAL

## 2019-09-22 VITALS
SYSTOLIC BLOOD PRESSURE: 138 MMHG | OXYGEN SATURATION: 95 % | DIASTOLIC BLOOD PRESSURE: 78 MMHG | RESPIRATION RATE: 16 BRPM | WEIGHT: 217 LBS | HEART RATE: 72 BPM | BODY MASS INDEX: 28.76 KG/M2 | TEMPERATURE: 97 F | HEIGHT: 73 IN

## 2019-09-22 DIAGNOSIS — J45.41 MODERATE PERSISTENT ASTHMA WITH ACUTE EXACERBATION: ICD-10-CM

## 2019-09-22 PROCEDURE — 99213 OFFICE O/P EST LOW 20 MIN: CPT | Performed by: FAMILY MEDICINE

## 2019-09-22 RX ORDER — IPRATROPIUM BROMIDE AND ALBUTEROL SULFATE 2.5; .5 MG/3ML; MG/3ML
3 SOLUTION RESPIRATORY (INHALATION) ONCE
Status: COMPLETED | OUTPATIENT
Start: 2019-09-22 | End: 2019-09-22

## 2019-09-22 RX ADMIN — IPRATROPIUM BROMIDE AND ALBUTEROL SULFATE 3 ML: 2.5; .5 SOLUTION RESPIRATORY (INHALATION) at 11:20

## 2019-09-22 ASSESSMENT — ENCOUNTER SYMPTOMS
COUGH: 1
WHEEZING: 1
VOMITING: 0
SHORTNESS OF BREATH: 1
FEVER: 0

## 2019-09-22 NOTE — PROGRESS NOTES
Subjective:     Sarabjit Escobar is a 38 y.o. male who presents for Asthma (Asthma flare ups, shortness of breath, cough [id number] )    HPI  Pt presents for evaluation of recurrent asthma   Did see pulmonology and lowered his Asmanex dose  Instructed to get allergy testing and stop all allergy medications   Since doing that, feeling worse   Has restarted Singulair and Flonase and using Asmanex daily   Has home nebulizer medication, but no home nebulizer machine     Review of Systems   Constitutional: Negative for fever.   Respiratory: Positive for cough, shortness of breath and wheezing.    Cardiovascular: Negative for chest pain.   Gastrointestinal: Negative for vomiting.   Skin: Negative for rash.     PMH:  has a past medical history of Shortness of breath and Wheezing.  MEDS:   Current Outpatient Medications:   •  Fexofenadine HCl (ALLEGRA ALLERGY PO), Take 1 Tab by mouth every day., Disp: , Rfl:   •  Fluticasone Propionate (FLONASE NA), Spray 1 Spray in nose every day. Alternating nostrils to prevent nose bleeds, Disp: , Rfl:   •  Mometasone Furoate (ASMANEX HFA) 100 MCG/ACT Aerosol, Inhale 1 Puff by mouth every day., Disp: 1 Inhaler, Rfl: 11  •  albuterol 108 (90 Base) MCG/ACT Aero Soln inhalation aerosol, Inhale 2 Puffs by mouth every four hours as needed., Disp: 1 Inhaler, Rfl: 0  •  ipratropium-albuterol (DUONEB) 0.5-2.5 (3) MG/3ML nebulizer solution, 3 mL by Nebulization route every 6 hours as needed for Shortness of Breath for up to 30 doses., Disp: 90 mL, Rfl: 0  •  montelukast (SINGULAIR) 10 MG Tab, Take 1 Tab by mouth every day for 90 days., Disp: 30 Tab, Rfl: 2  •  predniSONE (DELTASONE) 20 MG Tab, Take 2 tabs (40mg) daily x 3 days, then take 1 tab (20mg) daily x 3 days, then take 1/2 tab (10mg) daily x 4 days, Disp: 11 Tab, Rfl: 0  ALLERGIES: No Known Allergies  SURGHX: No past surgical history on file.  SOCHX:  reports that he has never smoked. He has never used smokeless tobacco. He reports  "that he drinks about 4.2 oz of alcohol per week. He reports that he does not use drugs.  FH: Family history was reviewed, not contributing to acute illness     Objective:   /78   Pulse 72   Temp 36.1 °C (97 °F) (Temporal)   Resp 16   Ht 1.854 m (6' 1\")   Wt 98.4 kg (217 lb)   SpO2 95%   BMI 28.63 kg/m²     Physical Exam   Constitutional: He is oriented to person, place, and time. He appears well-developed and well-nourished. No distress.   HENT:   Head: Normocephalic and atraumatic.   Eyes: Conjunctivae and EOM are normal.   Neck: Normal range of motion. No tracheal deviation present.   Cardiovascular: Normal rate, regular rhythm and normal heart sounds.   Pulmonary/Chest:   Moderate air movement, scatter expiratory wheezes present, no focal rales, no increased work of breathing    Musculoskeletal: Normal range of motion.   Neurological: He is alert and oriented to person, place, and time.   Skin: Skin is warm and dry. No rash noted. He is not diaphoretic.   Psychiatric: He has a normal mood and affect. His behavior is normal. Judgment and thought content normal.     Assessment/Plan:   Assessment    1. Moderate persistent asthma with acute exacerbation  - ipratropium-albuterol (DUONEB) nebulizer solution  Patient with moderate persistent asthma with acute exacerbation.  Patient given DuoNeb treatment in office and prescribed nebulizer machine for home use.  Patient already has home medications.  He will have follow-up with pulmonology.      "

## 2019-09-23 ENCOUNTER — TELEPHONE (OUTPATIENT)
Dept: URGENT CARE | Facility: PHYSICIAN GROUP | Age: 39
End: 2019-09-23

## 2019-09-23 NOTE — TELEPHONE ENCOUNTER
[facility] phoned stating they are not contracted with [facility].  It needs to be sent to [facility].

## 2019-09-25 ENCOUNTER — TELEPHONE (OUTPATIENT)
Dept: PULMONOLOGY | Facility: HOSPICE | Age: 39
End: 2019-09-25

## 2019-09-25 NOTE — TELEPHONE ENCOUNTER
"Patient calling he saw Dr Mcdowell 9/18/19 and states his Asmanex dose was lowered because he was doing so well. After taking the lower dose of inhaler he states\" I immediately got worse\" Patient asking for advise from Dr Mcdowell. Next appointment 10/30/19.   "

## 2019-09-27 ENCOUNTER — HOSPITAL ENCOUNTER (OUTPATIENT)
Dept: LAB | Facility: MEDICAL CENTER | Age: 39
End: 2019-09-27
Attending: INTERNAL MEDICINE
Payer: COMMERCIAL

## 2019-09-27 PROCEDURE — 86003 ALLG SPEC IGE CRUDE XTRC EA: CPT | Mod: 91

## 2019-09-27 PROCEDURE — 36415 COLL VENOUS BLD VENIPUNCTURE: CPT

## 2019-09-27 PROCEDURE — 82785 ASSAY OF IGE: CPT

## 2019-09-30 LAB
A ALTERNATA IGE QN: <0.1 KU/L
A FUMIGATUS IGE QN: <0.1 KU/L
BERMUDA GRASS IGE QN: <0.1 KU/L
BOXELDER IGE QN: <0.1 KU/L
C SPHAEROSPERMUM IGE QN: <0.1 KU/L
CAT DANDER IGE QN: 10.2 KU/L
CMN PIGWEED IGE QN: <0.1 KU/L
COMMON RAGWEED IGE QN: <0.1 KU/L
COTTONWOOD IGE QN: <0.1 KU/L
D FARINAE IGE QN: 4.62 KU/L
D PTERONYSS IGE QN: 3.4 KU/L
DEPRECATED MISC ALLERGEN IGE RAST QL: NORMAL
DOG DANDER IGE QN: 0.47 KU/L
IGE SERPL-ACNC: 84 KU/L
M RACEMOSUS IGE QN: <0.1 KU/L
MOUSE EPITH IGE QN: <0.1 KU/L
MT JUNIPER IGE QN: <0.1 KU/L
MUGWORT IGE QN: <0.1 KU/L
OLIVE POLN IGE QN: <0.1 KU/L
P NOTATUM IGE QN: <0.1 KU/L
ROACH IGE QN: <0.1 KU/L
SALTWORT IGE QN: <0.1 KU/L
TIMOTHY IGE QN: 4.58 KU/L
WHITE ELM IGE QN: <0.1 KU/L
WHITE MULBERRY IGE QN: <0.1 KU/L
WHITE OAK IGE QN: 1.19 KU/L

## 2019-10-03 NOTE — TELEPHONE ENCOUNTER
I called patient he states he did go back to the higher dose for a bit, but is NOW on the lower dose again and is fine. Patient to discuss Asmanex on follow up with Dr Mcdowell on 10/30/19

## 2019-10-11 ENCOUNTER — OFFICE VISIT (OUTPATIENT)
Dept: URGENT CARE | Facility: PHYSICIAN GROUP | Age: 39
End: 2019-10-11
Payer: COMMERCIAL

## 2019-10-11 VITALS
BODY MASS INDEX: 27.57 KG/M2 | TEMPERATURE: 99.9 F | HEART RATE: 123 BPM | DIASTOLIC BLOOD PRESSURE: 82 MMHG | RESPIRATION RATE: 15 BRPM | WEIGHT: 208 LBS | OXYGEN SATURATION: 96 % | SYSTOLIC BLOOD PRESSURE: 128 MMHG | HEIGHT: 73 IN

## 2019-10-11 DIAGNOSIS — J45.901 MILD ASTHMA WITH ACUTE EXACERBATION, UNSPECIFIED WHETHER PERSISTENT: ICD-10-CM

## 2019-10-11 PROCEDURE — 99214 OFFICE O/P EST MOD 30 MIN: CPT | Performed by: PHYSICIAN ASSISTANT

## 2019-10-11 RX ORDER — IPRATROPIUM BROMIDE AND ALBUTEROL SULFATE 2.5; .5 MG/3ML; MG/3ML
3 SOLUTION RESPIRATORY (INHALATION) ONCE
Status: COMPLETED | OUTPATIENT
Start: 2019-10-11 | End: 2019-10-11

## 2019-10-11 RX ADMIN — IPRATROPIUM BROMIDE AND ALBUTEROL SULFATE 3 ML: 2.5; .5 SOLUTION RESPIRATORY (INHALATION) at 18:39

## 2019-10-11 ASSESSMENT — ENCOUNTER SYMPTOMS
SORE THROAT: 1
SPUTUM PRODUCTION: 0
CHEST TIGHTNESS: 1
HEMOPTYSIS: 0
COUGH: 1
SHORTNESS OF BREATH: 1
WHEEZING: 1

## 2019-10-12 NOTE — PROGRESS NOTES
"Subjective:      Sarabjit Escobar is a 38 y.o. male who presents with Asthma (Nightime flairs, requesting a home nebulizer)            Patient is a 38-year-old male who presents to urgent care with chest tightness, cough worse the last 24 hours.  Patient has long history of asthma which has been utilizing his rescue inhaler with minimal improvement of symptoms.  Patient does have upcoming appointment with his pulmonologist later this month.  Recent prescription of a nebulizer machine note was given however patient has had problems getting such approved secondary to insurance.  He does report recent congestion and recent sore throat although both are feeling notably improved today.  He is currently on Asmanex 100 dose HFA although does report that the metered powder dose does not \"go deep enough \".  He does have the home vials for the DuoNeb as he feels notably better with such and is requesting treatment today.  He is currently being worked up for allergies.    Asthma   He complains of chest tightness, cough, shortness of breath and wheezing. There is no hemoptysis or sputum production. This is a new problem. The current episode started yesterday. The problem occurs constantly. The problem has been unchanged. The cough is non-productive. Associated symptoms include a sore throat. His symptoms are aggravated by change in weather and URI. His symptoms are alleviated by beta-agonist. He reports moderate improvement on treatment. His past medical history is significant for asthma.       Review of Systems   HENT: Positive for congestion and sore throat.    Respiratory: Positive for cough, shortness of breath and wheezing. Negative for hemoptysis and sputum production.    All other systems reviewed and are negative.         Objective:     /82   Pulse (!) 123   Temp 37.7 °C (99.9 °F)   Resp 15   Ht 1.854 m (6' 1\")   Wt 94.3 kg (208 lb)   SpO2 96%   BMI 27.44 kg/m²    PMH:  has a past medical history " of Shortness of breath and Wheezing.  MEDS:   Current Outpatient Medications:   •  Fexofenadine HCl (ALLEGRA ALLERGY PO), Take 1 Tab by mouth every day., Disp: , Rfl:   •  Fluticasone Propionate (FLONASE NA), Spray 1 Spray in nose every day. Alternating nostrils to prevent nose bleeds, Disp: , Rfl:   •  Mometasone Furoate (ASMANEX HFA) 100 MCG/ACT Aerosol, Inhale 1 Puff by mouth every day., Disp: 1 Inhaler, Rfl: 11  •  albuterol 108 (90 Base) MCG/ACT Aero Soln inhalation aerosol, Inhale 2 Puffs by mouth every four hours as needed., Disp: 1 Inhaler, Rfl: 0  •  ipratropium-albuterol (DUONEB) 0.5-2.5 (3) MG/3ML nebulizer solution, 3 mL by Nebulization route every 6 hours as needed for Shortness of Breath for up to 30 doses., Disp: 90 mL, Rfl: 0  •  montelukast (SINGULAIR) 10 MG Tab, Take 1 Tab by mouth every day for 90 days. (Patient not taking: Reported on 10/11/2019), Disp: 30 Tab, Rfl: 2  •  predniSONE (DELTASONE) 20 MG Tab, Take 2 tabs (40mg) daily x 3 days, then take 1 tab (20mg) daily x 3 days, then take 1/2 tab (10mg) daily x 4 days (Patient not taking: Reported on 10/11/2019), Disp: 11 Tab, Rfl: 0    Current Facility-Administered Medications:   •  ipratropium-albuterol (DUONEB) nebulizer solution, 3 mL, Nebulization, Once, Ag Lua P.A.-C.  ALLERGIES: No Known Allergies  SURGHX: No past surgical history on file.  SOCHX:  reports that he has never smoked. He has never used smokeless tobacco. He reports that he drinks about 4.2 oz of alcohol per week. He reports that he does not use drugs.  FH: Family history was reviewed, no pertinent findings to report    Physical Exam   Constitutional: He is oriented to person, place, and time. He appears well-developed and well-nourished.   HENT:   Head: Normocephalic and atraumatic.   Mouth/Throat: No oropharyngeal exudate.   Ears- Canals clear- TM- with clear fluid effusions bilaterally.   Pos. PND, with slight erythema- without tonsillar edema or exudate.   Mild  discharge noted bilaterally- to nares.      Eyes: Pupils are equal, round, and reactive to light. EOM are normal.   Neck: Normal range of motion. Neck supple.   Cardiovascular: Normal rate and regular rhythm.   No murmur heard.  Pulmonary/Chest: Effort normal. No respiratory distress. He has wheezes.   Musculoskeletal: Normal range of motion. He exhibits no tenderness.   Lymphadenopathy:     He has no cervical adenopathy.   Neurological: He is alert and oriented to person, place, and time.   Skin: Skin is warm. No rash noted.   Psychiatric: He has a normal mood and affect. His behavior is normal.   Vitals reviewed.              Assessment/Plan:     1. Mild asthma with acute exacerbation, unspecified whether persistent  - ipratropium-albuterol (DUONEB) nebulizer solution    Recheck after breathing treatment today-significant subjective improvement per patient.  Encourage continued utilization of inhaled steroid and discussed other options for home nebulizer that he is welcome to order even off-line.  Patient was given tubing as well.  Patient will follow-up with pulmonology later this month for further management and evaluation.  Patient given precautionary s/sx that mandate immediate follow up and evaluation in the ED. Advised of risks of not doing so.    DDX, Supportive care, and indications for immediate follow-up discussed with patient.    Instructed to return to clinic or nearest emergency department if we are not available for any change in condition, further concerns, or worsening of symptoms.    The patient demonstrated a good understanding and agreed with the treatment plan.  Please note that this dictation was created using voice recognition software. I have made every reasonable attempt to correct obvious errors, but I expect that there are errors of grammar and possibly content that I did not discover before finalizing the note.

## 2019-10-14 ENCOUNTER — PATIENT MESSAGE (OUTPATIENT)
Dept: PULMONOLOGY | Facility: HOSPICE | Age: 39
End: 2019-10-14

## 2019-10-14 DIAGNOSIS — Z91.09 ALLERGY TO ENVIRONMENTAL FACTORS: ICD-10-CM

## 2019-10-14 NOTE — TELEPHONE ENCOUNTER
----- Message from Nidhi Albert, Med Ass't sent at 10/14/2019 10:32 AM PDT -----  Zehra Crespo M.D.  Nidhi Albert, Med Ass't  Please john paul patient and let him know he has multiple allergies and does he want a referral to an allergist       Called pt and l/m. Asking pt to return my call regarding test results.  Sent [employer] message.

## 2019-10-14 NOTE — PATIENT COMMUNICATION
I spoke to the patient informed per Dr. Mcdowell reviewed labs due to results of multiple allergies. Pt agreed to being referred to allergist. I informed the patient referred will be placed and referral department will work on finding allergist contracted with insurance, either phone call or letter will be sent to set appointment. Pt aware.

## 2019-10-20 ENCOUNTER — SUPERVISING PHYSICIAN REVIEW (OUTPATIENT)
Dept: URGENT CARE | Facility: PHYSICIAN GROUP | Age: 39
End: 2019-10-20

## 2019-10-30 ENCOUNTER — OFFICE VISIT (OUTPATIENT)
Dept: PULMONOLOGY | Facility: HOSPICE | Age: 39
End: 2019-10-30
Payer: COMMERCIAL

## 2019-10-30 VITALS
SYSTOLIC BLOOD PRESSURE: 144 MMHG | OXYGEN SATURATION: 99 % | BODY MASS INDEX: 29.08 KG/M2 | HEIGHT: 73 IN | WEIGHT: 219.38 LBS | HEART RATE: 74 BPM | DIASTOLIC BLOOD PRESSURE: 84 MMHG

## 2019-10-30 DIAGNOSIS — J45.40 MODERATE PERSISTENT ASTHMA WITHOUT COMPLICATION: ICD-10-CM

## 2019-10-30 PROCEDURE — 99214 OFFICE O/P EST MOD 30 MIN: CPT | Performed by: INTERNAL MEDICINE

## 2019-10-30 RX ORDER — MONTELUKAST SODIUM 10 MG/1
10 TABLET ORAL EVERY EVENING
Qty: 30 TAB | Refills: 5 | Status: SHIPPED | OUTPATIENT
Start: 2019-10-30 | End: 2020-01-01 | Stop reason: SDUPTHER

## 2019-10-30 NOTE — PROGRESS NOTES
CC:  Chief Complaint   Patient presents with   • Follow-Up     Other form of dyspnea.  Last seen 09/18/19.   • Results     Labs 09/30/19   • Medication Management     Pt was started Asmanex from last visit.      Follow-up appointment for moderate persistent asthma    HPI:   The patient is a 38 y.o. male.  With history of moderate persistent asthma came today for follow-up.  The patient was tested for common environmental allergens before today's visit he was found to be allergic to house mites.  However his IgE level was elevated.    The patient increased his Asmanex dose to 200 twice daily because his symptoms were not well controlled especially at night.  Currently symptoms are better at this dose.  He is taken Flonase for nasal allergy and Allegra.        ROS:   Constitutional: Denies fevers, chills, night sweats  Eyes: Denies vision loss, pain, drainage, double vision  Ears, Nose, Throat: Denies earache, difficulty hearing, tinnitus, nasal congestion, hoarseness  Cardiovascular: Denies chest pain, tightness, palpitations, orthopnea or edema  Respiratory: See HPI  GI: Denies heartburn, dysphagia, nausea, abdominal pain, diarrhea or constipation  : Denies frequent urination, hematuria, discharge or painful urination  Musculoskeletal: Denies back pain, painful joints, sore muscles  Neurological: Denies weakness or headaches  Skin: No rashes  All other ROS were negative except what mentioned in the HPI     Past Medical History:  Past Medical History:   Diagnosis Date   • Shortness of breath    • Wheezing                Social History:  Social History     Socioeconomic History   • Marital status: Single     Spouse name: Not on file   • Number of children: Not on file   • Years of education: Not on file   • Highest education level: Not on file   Occupational History   • Not on file   Social Needs   • Financial resource strain: Not on file   • Food insecurity:     Worry: Not on file     Inability: Not on file   •  Transportation needs:     Medical: Not on file     Non-medical: Not on file   Tobacco Use   • Smoking status: Never Smoker   • Smokeless tobacco: Never Used   Substance and Sexual Activity   • Alcohol use: Yes     Alcohol/week: 4.2 oz     Types: 5 Cans of beer, 2 Shots of liquor per week     Frequency: 2-3 times a week     Drinks per session: 1 or 2   • Drug use: No   • Sexual activity: Not on file   Lifestyle   • Physical activity:     Days per week: Not on file     Minutes per session: Not on file   • Stress: Not on file   Relationships   • Social connections:     Talks on phone: Not on file     Gets together: Not on file     Attends Quaker service: Not on file     Active member of club or organization: Not on file     Attends meetings of clubs or organizations: Not on file     Relationship status: Not on file   • Intimate partner violence:     Fear of current or ex partner: Not on file     Emotionally abused: Not on file     Physically abused: Not on file     Forced sexual activity: Not on file   Other Topics Concern   • Not on file   Social History Narrative   • Not on file             Family History:  Family History   Problem Relation Age of Onset   • Diabetes Father    • Stroke Paternal Aunt 66        mild   • Cancer Paternal Grandmother 90        pancreatic        Current Outpatient Medications on File Prior to Visit   Medication Sig Dispense Refill   • Fexofenadine HCl (ALLEGRA ALLERGY PO) Take 1 Tab by mouth every day.     • Fluticasone Propionate (FLONASE NA) Spray 1 Spray in nose every day. Alternating nostrils to prevent nose bleeds     • Mometasone Furoate (ASMANEX HFA) 100 MCG/ACT Aerosol Inhale 1 Puff by mouth every day. 1 Inhaler 11   • albuterol 108 (90 Base) MCG/ACT Aero Soln inhalation aerosol Inhale 2 Puffs by mouth every four hours as needed. 1 Inhaler 0   • ipratropium-albuterol (DUONEB) 0.5-2.5 (3) MG/3ML nebulizer solution 3 mL by Nebulization route every 6 hours as needed for Shortness of  "Breath for up to 30 doses. 90 mL 0   • montelukast (SINGULAIR) 10 MG Tab Take 1 Tab by mouth every day for 90 days. (Patient not taking: Reported on 10/11/2019) 30 Tab 2   • predniSONE (DELTASONE) 20 MG Tab Take 2 tabs (40mg) daily x 3 days, then take 1 tab (20mg) daily x 3 days, then take 1/2 tab (10mg) daily x 4 days (Patient not taking: Reported on 10/11/2019) 11 Tab 0     No current facility-administered medications on file prior to visit.        Allergies:   Patient has no known allergies.        Vitals:    10/30/19 0855 10/30/19 0856   Height: 1.854 m (6' 1\")    Weight: 99.5 kg (219 lb 6 oz)    Weight % change since last entry.: 0 %    BP: 144/84    Pulse: 74    BMI (Calculated): 28.94    O2 sat % room air:  99 %           Physical Exam:  Appearance:  in no acute distress  HEENT: Normocephalic, atraumatic, white sclera, PERRLA, oropharynx clear  Neck: No adenopathy or masses  Respiratory: no intercostal retractions or accessory muscle use  Lungs auscultation: Clear to auscultation bilaterally  Cardiovascular: Regular rate rhythm. No murmurs, rubs or gallops.  No LE edema  Abdomen: soft, nondistended  Gait: Normal  Digits: No clubbing, cyanosis  Motor: No focal deficits  Orientation: Oriented to time, person and place      DATA:    Labs:  Lab Results   Component Value Date/Time    WBC 3.7 (L) 07/11/2019 08:39 AM    RBC 5.17 07/11/2019 08:39 AM    HEMOGLOBIN 15.7 07/11/2019 08:39 AM    HEMATOCRIT 46.5 07/11/2019 08:39 AM    MCV 89.9 07/11/2019 08:39 AM    MCH 30.4 07/11/2019 08:39 AM    MCHC 33.8 07/11/2019 08:39 AM    MPV 11.5 07/11/2019 08:39 AM    NEUTSPOLYS 60.00 07/11/2019 08:39 AM    LYMPHOCYTES 25.90 07/11/2019 08:39 AM    MONOCYTES 10.30 07/11/2019 08:39 AM    EOSINOPHILS 2.70 07/11/2019 08:39 AM    BASOPHILS 0.80 07/11/2019 08:39 AM      Lab Results   Component Value Date/Time    SODIUM 141 07/11/2019 08:39 AM    POTASSIUM 3.9 07/11/2019 08:39 AM    CHLORIDE 104 07/11/2019 08:39 AM    CO2 28 07/11/2019 " 08:39 AM    GLUCOSE 86 07/11/2019 08:39 AM    BUN 14 07/11/2019 08:39 AM    CREATININE 0.93 07/11/2019 08:39 AM                  Diagnosis:  1. Moderate persistent asthma without complication  montelukast (SINGULAIR) 10 MG Tab    Mometasone Furoate (ASMANEX HFA) 200 MCG/ACT Aerosol        Assessment and Plan   Continue Asmanex, increased dose to 200 mcg twice daily  I changed Allegra to Singulair and asked the patient to take Singulair once daily.  Continue Flonase for nasal allergies.    Patient is non-smoker  Patient declined to have the flu vaccine today because he is allergic to eggs.  He will try to take the egg products free vaccine                      Return in about 6 months (around 4/30/2020).        This note was created using voice recognition software. I apologize for any overlooked obvious grammar or  vocabulary mistake

## 2019-11-06 ENCOUNTER — OFFICE VISIT (OUTPATIENT)
Dept: MEDICAL GROUP | Facility: PHYSICIAN GROUP | Age: 39
End: 2019-11-06
Payer: COMMERCIAL

## 2019-11-06 VITALS
BODY MASS INDEX: 28.36 KG/M2 | HEIGHT: 73 IN | SYSTOLIC BLOOD PRESSURE: 118 MMHG | DIASTOLIC BLOOD PRESSURE: 70 MMHG | RESPIRATION RATE: 16 BRPM | WEIGHT: 214 LBS | TEMPERATURE: 98.6 F | OXYGEN SATURATION: 96 % | HEART RATE: 74 BPM

## 2019-11-06 DIAGNOSIS — Z00.00 WELL ADULT EXAM: Primary | ICD-10-CM

## 2019-11-06 DIAGNOSIS — Z23 NEED FOR VACCINATION: ICD-10-CM

## 2019-11-06 DIAGNOSIS — J45.30 MILD PERSISTENT ASTHMA WITHOUT COMPLICATION: ICD-10-CM

## 2019-11-06 PROBLEM — Z86.39 HISTORY OF IRON DEFICIENCY: Status: RESOLVED | Noted: 2019-06-26 | Resolved: 2019-11-06

## 2019-11-06 PROCEDURE — 90471 IMMUNIZATION ADMIN: CPT | Performed by: FAMILY MEDICINE

## 2019-11-06 PROCEDURE — 90732 PPSV23 VACC 2 YRS+ SUBQ/IM: CPT | Performed by: FAMILY MEDICINE

## 2019-11-06 PROCEDURE — 99395 PREV VISIT EST AGE 18-39: CPT | Mod: 25 | Performed by: FAMILY MEDICINE

## 2019-11-06 NOTE — PROGRESS NOTES
"cc: asthma      Subjective:     Sarabjit Escobar is a 38 y.o. male presenting for the following:     Patient does feel well today.    Asthma: Patient did have asthma as a child but it did mostly improve.  But then moved to [place] about one year ago. Then his allergy flared and he also got ill a few times.  So he then started struggling with his asthma again.    He has seen an allergist and has been started on regular Asmanex and Singulair.  He has not been exercising and does plan on starting this again.  He has been taking Asmanex and only needing albuterol occasionally.    Review of systems:  All others reviewed and are negative.       Current Outpatient Medications:   •  Mometasone Furoate (ASMANEX 120 METERED DOSES INH), Inhale 1 Inhalation by mouth 2 Times a Day., Disp: , Rfl:   •  Mometasone Furoate 200 MCG/ACT Aerosol, Inhale 1 Puff by mouth every day., Disp: , Rfl:   •  montelukast (SINGULAIR) 10 MG Tab, Take 1 Tab by mouth every evening., Disp: 30 Tab, Rfl: 5  •  Fexofenadine HCl (ALLEGRA ALLERGY PO), Take 1 Tab by mouth every day., Disp: , Rfl:   •  Fluticasone Propionate (FLONASE NA), Spray 1 Spray in nose every day. Alternating nostrils to prevent nose bleeds, Disp: , Rfl:   •  albuterol 108 (90 Base) MCG/ACT Aero Soln inhalation aerosol, Inhale 2 Puffs by mouth every four hours as needed., Disp: 1 Inhaler, Rfl: 0  •  ipratropium-albuterol (DUONEB) 0.5-2.5 (3) MG/3ML nebulizer solution, 3 mL by Nebulization route every 6 hours as needed for Shortness of Breath for up to 30 doses., Disp: 90 mL, Rfl: 0    Allergies, past medical history, past surgical history, family history, social history reviewed and updated    Objective:     Vitals: /70   Pulse 74   Temp 37 °C (98.6 °F)   Resp 16   Ht 1.854 m (6' 1\")   Wt 97.1 kg (214 lb)   SpO2 96%   BMI 28.23 kg/m²   General: Alert, pleasant, NAD  HEENT: Normocephalic.   EOMI, no icterus or pallor.  Conjunctivae and lids normal. External ears " normal. Oropharynx non-erythematous, mucous membranes moist.    Neck supple.  No thyromegaly or masses palpated. No cervical or supraclavicular lymphadenopathy.  Heart: Regular rate and rhythm.  S1 and S2 normal.  No murmurs appreciated.  Respiratory: Normal respiratory effort.  Clear to auscultation bilaterally.  Abdomen: Non-distended, soft  Skin: Warm, dry, no rashes.  Musculoskeletal: Gait is normal.  Moves all extremities well.  Extremities: No leg edema.    Neurological: No tremors,  CN2-12 grossly intact  Psych:  Affect is normal, judgement is good, memory is intact, grooming is appropriate.    Assessment/Plan:     Sarabjit was seen today for establish care.    Diagnoses and all orders for this visit:    Well adult exam: Patient feels well today and PE normal.     Mild persistent asthma without complication: Patient is planning on starting to exercise again. Will send Breo if asmanex not sufficient when patient starts running again.  -     Pneumococal Polysaccharide Vaccine 23-Valent =>3YO SQ/IM    Need for vaccination Patient due for vaccination.  Patient warned of possible side effect including pain, reaction at injection site, fatigue, low-grade fever.  Also, patient warned of uncommon severe reactions including allergic reaction/anaphylaxis.   -     Pneumococal Polysaccharide Vaccine 23-Valent =>3YO SQ/IM      Return if symptoms worsen or fail to improve.

## 2020-01-01 DIAGNOSIS — J45.40 MODERATE PERSISTENT ASTHMA WITHOUT COMPLICATION: ICD-10-CM

## 2020-01-02 RX ORDER — MONTELUKAST SODIUM 10 MG/1
10 TABLET ORAL EVERY EVENING
Qty: 30 TAB | Refills: 11 | Status: SHIPPED | OUTPATIENT
Start: 2020-01-02

## 2020-01-02 NOTE — TELEPHONE ENCOUNTER
Have we ever prescribed this med? Yes.  If yes, what date? 10/30/19    Last OV: 10/30/19 ARLETTE Walden MD     Next OV: No follow up on file; Pt not due back until 4/30/2020    DX: Moderate persistent asthma without complication (J45.40)    Medications: Take 1 Tab by mouth every evening

## 2020-01-03 ENCOUNTER — OFFICE VISIT (OUTPATIENT)
Dept: PULMONOLOGY | Facility: HOSPICE | Age: 40
End: 2020-01-03
Payer: COMMERCIAL

## 2020-01-03 VITALS
SYSTOLIC BLOOD PRESSURE: 142 MMHG | OXYGEN SATURATION: 93 % | HEART RATE: 83 BPM | BODY MASS INDEX: 29.42 KG/M2 | DIASTOLIC BLOOD PRESSURE: 82 MMHG | HEIGHT: 73 IN | WEIGHT: 222 LBS | RESPIRATION RATE: 16 BRPM

## 2020-01-03 DIAGNOSIS — J30.2 SEASONAL ALLERGIES: ICD-10-CM

## 2020-01-03 DIAGNOSIS — R05.9 COUGH: ICD-10-CM

## 2020-01-03 DIAGNOSIS — Z78.9 NONSMOKER: ICD-10-CM

## 2020-01-03 DIAGNOSIS — Z91.09 ENVIRONMENTAL ALLERGIES: ICD-10-CM

## 2020-01-03 DIAGNOSIS — J45.41 MODERATE PERSISTENT ASTHMA WITH ACUTE EXACERBATION: ICD-10-CM

## 2020-01-03 PROCEDURE — 99214 OFFICE O/P EST MOD 30 MIN: CPT | Performed by: NURSE PRACTITIONER

## 2020-01-03 RX ORDER — PREDNISONE 10 MG/1
TABLET ORAL
Qty: 30 TAB | Refills: 0 | Status: SHIPPED | OUTPATIENT
Start: 2020-01-03 | End: 2020-01-28

## 2020-01-03 RX ORDER — IPRATROPIUM BROMIDE AND ALBUTEROL SULFATE 2.5; .5 MG/3ML; MG/3ML
3 SOLUTION RESPIRATORY (INHALATION) EVERY 6 HOURS PRN
Qty: 90 ML | Refills: 1 | Status: SHIPPED | OUTPATIENT
Start: 2020-01-03 | End: 2020-03-06 | Stop reason: SDUPTHER

## 2020-01-03 NOTE — PROGRESS NOTES
Dispensed Fluticasone Furoate-Vilanterol (BREO ELLIPTA) 200-25 MCG/INH AEROSOL POWDER, BREATH ACTIVATED samples x2.    Lot#: [id number]  Exp: 2/21    Provider:IVONE Smith     Logged distribution of sample on data sheet.     Dispensed by: Balbina De Anda

## 2020-01-03 NOTE — PROGRESS NOTES
Chief Complaint   Patient presents with   • Asthma       HPI:  Sarabjit Escobar is a 39 y.o. year old male here today for follow-up on asthma.  Patient moved from [facility] 1.5 years ago and this last July started having significant flares in his asthma.  Patient was noted to have asthma as a child with symptoms resolved by the age of 19 and no longer required inhalers.  He was able to run without issue.  In July he had an upper respiratory infection and treated antibiotics and steroids and then continue to have ongoing flares.  Recent blood work noted significant allergies to cat dander, mites and white [place] tree.  IgE level was normal.  He is referred to allergist.  We do not have any records from allergist but patient notes having significant allergies to many things in [place] and was advised start allergy injections.  He is completed to thus far.  He does have cats at home and is attempting to remove them from the bedroom but is having difficulty with his cats and fiancé.  We reviewed the importance of removing possible triggers.  He does use a humidifier in the home and an air purifier.  He changes filters and air conditioning and heater regularly.  He is currently using Asmanex 200 mcg 2 puffs twice daily and only uses nebulizer for sick times only.  He is also using Singulair nightly, daily antihistamine and Flonase daily.  He traveled over the holidays and acute symptoms started 1 week ago.  He notes his fiancée to be sick as well.  He notes increased shortness of breath, cough with white/yellow productive phlegm and using nebulizer 4 times in the last week.  He notes benefit with nebulizer use.  He is having postnasal drip.  He notes chest pain and chest tightness.  He denies wheezing.  He is unable to run.  He denies fever or chills.  He is due for his allergy injection today.    He overall feels poorly and is frustrated with his health.  He would like to get back to baseline to be able to  run again.  He notes weight gain due to prednisone use.  He was seen in urgent care during his holiday stay and given prednisone 60 mg x 3 days.      ROS: As per HPI and otherwise negative if not stated.    Past Medical History:   Diagnosis Date   • History of iron deficiency 6/26/2019   • Shortness of breath    • Wheezing        History reviewed. No pertinent surgical history.    Family History   Problem Relation Age of Onset   • Diabetes Father    • Stroke Paternal Aunt 66        mild   • Cancer Paternal Grandmother 90        pancreatic        Social History     Socioeconomic History   • Marital status: Single     Spouse name: Not on file   • Number of children: Not on file   • Years of education: Not on file   • Highest education level: Not on file   Occupational History   • Not on file   Social Needs   • Financial resource strain: Not on file   • Food insecurity:     Worry: Not on file     Inability: Not on file   • Transportation needs:     Medical: Not on file     Non-medical: Not on file   Tobacco Use   • Smoking status: Never Smoker   • Smokeless tobacco: Never Used   Substance and Sexual Activity   • Alcohol use: Yes     Alcohol/week: 4.2 oz     Types: 5 Cans of beer, 2 Shots of liquor per week     Frequency: 2-3 times a week     Drinks per session: 1 or 2   • Drug use: No   • Sexual activity: Not on file   Lifestyle   • Physical activity:     Days per week: Not on file     Minutes per session: Not on file   • Stress: Not on file   Relationships   • Social connections:     Talks on phone: Not on file     Gets together: Not on file     Attends Hoahaoism service: Not on file     Active member of club or organization: Not on file     Attends meetings of clubs or organizations: Not on file     Relationship status: Not on file   • Intimate partner violence:     Fear of current or ex partner: Not on file     Emotionally abused: Not on file     Physically abused: Not on file     Forced sexual activity: Not on file    Other Topics Concern   • Not on file   Social History Narrative   • Not on file       Allergies as of 01/03/2020   • (No Known Allergies)        Vitals:  @Vital signs for this encounter:    Current medications as of today   Current Outpatient Medications   Medication Sig Dispense Refill   • ipratropium-albuterol (DUONEB) 0.5-2.5 (3) MG/3ML nebulizer solution 3 mL by Nebulization route every 6 hours as needed for Shortness of Breath for up to 30 doses. 90 mL 1   • Fluticasone Furoate-Vilanterol (BREO ELLIPTA) 200-25 MCG/INH AEROSOL POWDER, BREATH ACTIVATED Inhale 1 Puff by mouth every day. Rinse mouth after use. 2 Each 0   • Fluticasone Furoate-Vilanterol (BREO ELLIPTA) 200-25 MCG/INH AEROSOL POWDER, BREATH ACTIVATED Inhale 1 Puff by mouth every day. Rinse mouth after use. 1 Each 11   • predniSONE (DELTASONE) 10 MG Tab Take 40mg x 3 days, then take 30mg x 3 days, then take 20mg x 3 days, then 10mg x 3 days with food, then discontinue. 30 Tab 0   • montelukast (SINGULAIR) 10 MG Tab Take 1 Tab by mouth every evening. 30 Tab 11   • Mometasone Furoate (ASMANEX 120 METERED DOSES INH) Inhale 1 Inhalation by mouth 2 Times a Day.     • Mometasone Furoate 200 MCG/ACT Aerosol Inhale 1 Puff by mouth every day.     • Fexofenadine HCl (ALLEGRA ALLERGY PO) Take 1 Tab by mouth every day.     • Fluticasone Propionate (FLONASE NA) Spray 1 Spray in nose every day. Alternating nostrils to prevent nose bleeds     • albuterol 108 (90 Base) MCG/ACT Aero Soln inhalation aerosol Inhale 2 Puffs by mouth every four hours as needed. 1 Inhaler 0     No current facility-administered medications for this visit.          Physical Exam:   Gen:           Alert and oriented, No apparent distress. Mood and affect appropriate, normal interaction with examiner.  Eyes:          PERRL, EOM intact, sclere white, conjunctive moist. Glasses.  Ears:          Not examined.   Hearing:     Grossly intact.  Nose:          Normal, no lesions or  deformities.  Dentition:    Good dentition.  Oropharynx:   Tongue normal, posterior pharynx without erythema or exudate.  Mallampati Classification: 2  Neck:        Supple, trachea midline, no masses.  Respiratory Effort: No intercostal retractions or use of accessory muscles.   Lung Auscultation:      Wheezing at end of expiration, otherwise CTA. No rhonchi/rales.  CV:            Regular rate and rhythm. No murmurs, rubs or gallops.  Abd:           Not examined.   Lymphadenopathy: Not examined.  Gait and Station: Normal.  Digits and Nails: No clubbing, cyanosis, petechiae, or nodes.   Cranial Nerves: II-XII grossly intact.  Skin:        No rashes, lesions or ulcers noted.               Ext:           No cyanosis or edema.      Assessment:  1. Moderate persistent asthma with acute exacerbation     2. Cough     3. Environmental allergies     4. BMI 29.0-29.9,adult     5. Nonsmoker     6. Seasonal allergies  ipratropium-albuterol (DUONEB) 0.5-2.5 (3) MG/3ML nebulizer solution       Immunizations:    Flu:recommend obtaining  Pneumovax 23:11/6/19  Prevnar 13:due age 66    Plan:  1.  Patient is currently having an asthma flare.  Recommend continuing allergy injections.  Start Breo 200 mcg 1 puff daily.  Stop Asmanex.  Rinse after use.  Rx with coupon given to patient.  Continue nebulizer as needed.  Start saline rinse twice daily followed by Nasacort or Flonase OTC.  Continue Singulair nightly and daily antihistamine.  Start prednisone taper now.  2.  Discussed pressure hygiene.  3.  May continue with running what symptoms stabilize.  4.  Follow-up in 1 month to check symptoms, sooner if needed.  Consider backing down on therapy once symptoms stabilize.  Advised patient to call if he feels he is having any side effects or needs further samples.    Please note that this dictation was created using voice recognition software. I have made every reasonable attempt to correct obvious errors, but it is possible there are  errors of grammar and possibly content that I did not discover before finalizing the note.

## 2020-01-03 NOTE — PATIENT INSTRUCTIONS
Start Breo 1 puff daily, rinse mouth x 1 month  RX given for Breo to check cost  Stop Asmanex  Continue nebulizer as needed.  Continue with allergist  Start prednisone taper and take till gone; watch appetite  Continue singulair and daily anithistamine  Start sinus rinse twice daily and follow with flonase or nasacort  Keep pets out of bedroom best possible

## 2020-01-28 ENCOUNTER — OFFICE VISIT (OUTPATIENT)
Dept: URGENT CARE | Facility: PHYSICIAN GROUP | Age: 40
End: 2020-01-28
Payer: COMMERCIAL

## 2020-01-28 ENCOUNTER — APPOINTMENT (OUTPATIENT)
Dept: URGENT CARE | Facility: PHYSICIAN GROUP | Age: 40
End: 2020-01-28
Payer: COMMERCIAL

## 2020-01-28 VITALS
WEIGHT: 215 LBS | TEMPERATURE: 97.5 F | OXYGEN SATURATION: 97 % | SYSTOLIC BLOOD PRESSURE: 142 MMHG | HEART RATE: 110 BPM | BODY MASS INDEX: 28.49 KG/M2 | RESPIRATION RATE: 16 BRPM | DIASTOLIC BLOOD PRESSURE: 92 MMHG | HEIGHT: 73 IN

## 2020-01-28 DIAGNOSIS — B37.0 ORAL THRUSH: ICD-10-CM

## 2020-01-28 DIAGNOSIS — J02.9 SORE THROAT: ICD-10-CM

## 2020-01-28 DIAGNOSIS — J32.4 PANSINUSITIS, UNSPECIFIED CHRONICITY: ICD-10-CM

## 2020-01-28 LAB
INT CON NEG: NORMAL
INT CON POS: NORMAL
S PYO AG THROAT QL: NEGATIVE

## 2020-01-28 PROCEDURE — 87880 STREP A ASSAY W/OPTIC: CPT | Performed by: NURSE PRACTITIONER

## 2020-01-28 PROCEDURE — 99214 OFFICE O/P EST MOD 30 MIN: CPT | Performed by: NURSE PRACTITIONER

## 2020-01-29 NOTE — PROGRESS NOTES
"Subjective:      Sarabjit Escobar is a 39 y.o. male who presents with Sinus Pain (sore throatx2.5weeks )    Reviewed past medical, surgical and family history. Reviewed prescription and OTC medications with patient in electronic health record today      No Known Allergies          HPI This is a new problem.  Sore throat , sinus discomfort, red spots in throat, thick phlegm in throat and asthma is acting up for the past 2.5 weeks. Treatments tried: Sudafed, allergy medication, flonase, allegra, asthmanex, singulair, benadryl. Everything helps for a little while.   Had steroids - prednisone for 3 days in Jan. Felt better but then got sick again and he took a 9 day taper course of prednisone. Now feeling sick again.     Review of Systems   Constitutional: Negative for chills and fever.   HENT: Positive for congestion, sinus pain and sore throat. Negative for ear pain.    Respiratory: Negative for cough and shortness of breath.    Cardiovascular: Negative for chest pain.   Gastrointestinal: Negative for nausea and vomiting.   Musculoskeletal: Negative for neck pain.   Psychiatric/Behavioral: Negative for substance abuse.          Objective:     /92   Pulse (!) 110   Temp 36.4 °C (97.5 °F) (Temporal)   Resp 16   Ht 1.854 m (6' 1\")   Wt 97.5 kg (215 lb)   SpO2 97%   BMI 28.37 kg/m²      Physical Exam  Vitals signs and nursing note reviewed.   Constitutional:       General: He is not in acute distress.     Appearance: He is well-developed and normal weight. He is not toxic-appearing.   HENT:      Head: Normocephalic and atraumatic.      Right Ear: Tympanic membrane, ear canal and external ear normal.      Left Ear: Tympanic membrane, ear canal and external ear normal.      Nose: Nose normal.      Mouth/Throat:      Lips: Pink.      Mouth: Mucous membranes are moist. Oral lesions (white patches on erythematous base) present.      Pharynx: Oropharynx is clear.   Eyes:      General:         Right eye: " No discharge.         Left eye: No discharge.      Conjunctiva/sclera: Conjunctivae normal.      Pupils: Pupils are equal, round, and reactive to light.   Neck:      Musculoskeletal: Neck supple.   Cardiovascular:      Rate and Rhythm: Normal rate and regular rhythm.   Pulmonary:      Effort: Pulmonary effort is normal.      Breath sounds: Normal breath sounds.   Lymphadenopathy:      Cervical: No cervical adenopathy.      Upper Body:      Right upper body: No supraclavicular adenopathy.      Left upper body: No supraclavicular adenopathy.   Skin:     General: Skin is warm and dry.      Capillary Refill: Capillary refill takes less than 2 seconds.   Neurological:      Mental Status: He is alert and oriented to person, place, and time.   Psychiatric:         Mood and Affect: Mood normal.         Behavior: Behavior normal.                 Assessment/Plan:     1. Oral thrush  nystatin (MYCOSTATIN) [id number] UNIT/ML Suspension   2. Sore throat  POCT Rapid Strep A   3. Pansinusitis, unspecified chronicity         Educated in proper administration of medication(s) ordered today including safety, possible SE, risks, benefits, rationale and alternatives to therapy.     Return to urgent care clinic or PCP 5-7  days if current symptoms are not resolving in a satisfactory manner or sooner if new or worsening symptoms occur. Differential diagnosis, natural history, supportive care, and indications for immediate follow-up discussed at length.   Advised of signs and symptoms which would warrant further evaluation and /or emergent evaluation in ER.    Verbalized agreement with this treatment plan and seemed to understand without barriers. Questions were encouraged and answered to satisfaction.      Humidifier at night prn     OTC flonase. Dosage and directions per     OTC antihistamine of choice. Follow manufactures dosing and safety guidelines.     OTC decongestant of choice. Follow manufacturers guidelines for dosing  and safety precautions.

## 2020-02-04 ASSESSMENT — LIFESTYLE VARIABLES: SUBSTANCE_ABUSE: 0

## 2020-02-04 ASSESSMENT — ENCOUNTER SYMPTOMS
NAUSEA: 0
COUGH: 0
CHILLS: 0
FEVER: 0
VOMITING: 0
SORE THROAT: 1
SINUS PAIN: 1
NECK PAIN: 0
SHORTNESS OF BREATH: 0

## 2020-02-06 ENCOUNTER — OFFICE VISIT (OUTPATIENT)
Dept: PULMONOLOGY | Facility: HOSPICE | Age: 40
End: 2020-02-06
Payer: COMMERCIAL

## 2020-02-06 VITALS
HEART RATE: 70 BPM | SYSTOLIC BLOOD PRESSURE: 130 MMHG | OXYGEN SATURATION: 96 % | HEIGHT: 73 IN | RESPIRATION RATE: 16 BRPM | DIASTOLIC BLOOD PRESSURE: 66 MMHG | WEIGHT: 220 LBS | BODY MASS INDEX: 29.16 KG/M2

## 2020-02-06 DIAGNOSIS — Z91.09 ENVIRONMENTAL ALLERGIES: ICD-10-CM

## 2020-02-06 DIAGNOSIS — J45.30 MILD PERSISTENT ASTHMA WITHOUT COMPLICATION: ICD-10-CM

## 2020-02-06 DIAGNOSIS — Z78.9 NONSMOKER: ICD-10-CM

## 2020-02-06 PROBLEM — J45.41 MODERATE PERSISTENT ASTHMA WITH ACUTE EXACERBATION: Status: RESOLVED | Noted: 2020-01-03 | Resolved: 2020-02-06

## 2020-02-06 PROCEDURE — 99214 OFFICE O/P EST MOD 30 MIN: CPT | Performed by: NURSE PRACTITIONER

## 2020-02-06 RX ORDER — AZITHROMYCIN 250 MG/1
TABLET, FILM COATED ORAL
Qty: 6 TAB | Refills: 0 | Status: SHIPPED | OUTPATIENT
Start: 2020-02-06 | End: 2020-06-23

## 2020-02-06 NOTE — PROGRESS NOTES
Last OV 1/3/2020    asthma.  Patient moved from [facility] 1.5 years ago and this last July started having significant flares in his asthma.  Patient was noted to have asthma as a child with symptoms resolved by the age of 19 and no longer required inhalers.  He was able to run without issue.  In July he had an upper respiratory infection and treated antibiotics and steroids and then continue to have ongoing flares.  Recent blood work noted significant allergies to cat dander, mites and white [place] tree.  IgE level was normal.  He is referred to allergist.  We do not have any records from allergist but patient notes having significant allergies to many things in [place] and was advised start allergy injections.  He is completed to thus far.  He does have cats at home and is attempting to remove them from the bedroom but is having difficulty with his cats and fiancé.  We reviewed the importance of removing possible triggers.  He does use a humidifier in the home and an air purifier.  He changes filters and air conditioning and heater regularly.  He is currently using Asmanex 200 mcg 2 puffs twice daily and only uses nebulizer for sick times only.  He is also using Singulair nightly, daily antihistamine and Flonase daily.  He traveled over the holidays and acute symptoms started 1 week ago.  He notes his fiancée to be sick as well.  He notes increased shortness of breath, cough with white/yellow productive phlegm and using nebulizer 4 times in the last week.  He notes benefit with nebulizer use.  He is having postnasal drip.  He notes chest pain and chest tightness.  He denies wheezing.  He is unable to run.  He denies fever or chills.  He is due for his allergy injection today.     He overall feels poorly and is frustrated with his health.  He would like to get back to baseline to be able to run again.  He notes weight gain due to prednisone use.  He was seen in urgent care during his holiday stay and given  prednisone 60 mg x 3 days.    Assessment:  1. Moderate persistent asthma with acute exacerbation      2. Cough      3. Environmental allergies      4. BMI 29.0-29.9,adult      5. Nonsmoker      6. Seasonal allergies  ipratropium-albuterol (DUONEB) 0.5-2.5 (3) MG/3ML nebulizer solution         Immunizations:     Flu:recommend obtaining  Pneumovax 23:11/6/19  Prevnar 13:due age 66     Plan:  1.  Patient is currently having an asthma flare.  Recommend continuing allergy injections.  Start Breo 200 mcg 1 puff daily.  Stop Asmanex.  Rinse after use.  Rx with coupon given to patient.  Continue nebulizer as needed.  Start saline rinse twice daily followed by Nasacort or Flonase OTC.  Continue Singulair nightly and daily antihistamine.  Start prednisone taper now.  2.  Discussed pressure hygiene.  3.  May continue with running what symptoms stabilize.  4.  Follow-up in 1 month to check symptoms, sooner if needed.  Consider backing down on therapy once symptoms stabilize.  Advised patient to call if he feels he is having any side effects or needs further samples.

## 2020-02-06 NOTE — LETTER
KALI Wade  [facility]   [address],   [facility] [place] [address]  Phone: [phone] - Fax: [phone]           Encounter Date: 2/6/2020  Provider: KALI Wade  Location of Care: [facility]      Patient:   Sarabjit Escobar   MR Number: [id number]   YOB: 1980     PROGRESS NOTE:  Chief Complaint   Patient presents with   • Follow-Up     Last Seen 1/3/2020       HPI:  Sarabjit Escobar is a 39 y.o. year old male here today for follow-up on asthma.    At last OV he was switched to Breo 200mcg 1 puff daily, singulair qhs, antihistamine QD,  Continuation of allergy shots and prednisone taper given. He used Breo for 14-16 days and found no improvement so returned to Asmanex 220mcg twisthaler 2 puffs once daily. He notes significant improvement. He has obtained a new air purifier. He sleeps in a different room that is unavailable to pets at home. He overall feels better but notes increased sinus congestion since last OV. He did go to  and strep test was negative. Nystatin given but only used for 4 days with no improvement. He continues to have sinus congestion but only using BRYANT 1x daily now and denies chest tightness/wheezing. No sick symptoms. He did travel since last visit and noted congestion during that time.   He feels his asthma is stable but not well enough to restart running.    HX:  Patient moved from [facility] 1.5 years ago and this last July started having significant flares in his asthma.  Patient was noted to have asthma as a child with symptoms resolved by the age of 19 and no longer required inhalers.  He was able to run without issue.  In July he had an upper respiratory infection and treated antibiotics and steroids and then continue to have ongoing flares.  Recent blood work noted significant allergies to cat dander, mites and white [place] tree.  IgE level  was normal.  He  was referred to allergist.  We do not have any records from allergist but patient notes having significant allergies to many things in [place] and was advised start allergy injections.  He is completed to thus far.  He does have cats at home and is attempting to remove them from the bedroom but is having difficulty with his cats and fiancé.  We reviewed the importance of removing possible triggers.  He does use a humidifier in the home and an air purifier.  He changes filters and air conditioning and heater regularly.        ROS: As per HPI and otherwise negative if not stated.    Past Medical History:   Diagnosis Date   • History of iron deficiency 6/26/2019   • Shortness of breath    • Wheezing        History reviewed. No pertinent surgical history.    Family History   Problem Relation Age of Onset   • Diabetes Father    • Stroke Paternal Aunt 66        mild   • Cancer Paternal Grandmother 90        pancreatic        Social History     Socioeconomic History   • Marital status: Single     Spouse name: Not on file   • Number of children: Not on file   • Years of education: Not on file   • Highest education level: Not on file   Occupational History   • Not on file   Social Needs   • Financial resource strain: Not on file   • Food insecurity:     Worry: Not on file     Inability: Not on file   • Transportation needs:     Medical: Not on file     Non-medical: Not on file   Tobacco Use   • Smoking status: Never Smoker   • Smokeless tobacco: Never Used   Substance and Sexual Activity   • Alcohol use: Yes     Alcohol/week: 4.2 oz     Types: 5 Cans of beer, 2 Shots of liquor per week     Frequency: 2-3 times a week     Drinks per session: 1 or 2   • Drug use: No   • Sexual activity: Not on file   Lifestyle   • Physical activity:     Days per week: Not on file     Minutes per session: Not on file   • Stress: Not on file   Relationships   • Social connections:     Talks on phone: Not on file     Gets  together: Not on file     Attends [employer] service: Not on file     Active member of club or organization: Not on file     Attends meetings of clubs or organizations: Not on file     Relationship status: Not on file   • Intimate partner violence:     Fear of current or ex partner: Not on file     Emotionally abused: Not on file     Physically abused: Not on file     Forced sexual activity: Not on file   Other Topics Concern   • Not on file   Social History Narrative   • Not on file       Allergies as of 02/06/2020   • (No Known Allergies)        Vitals:  @Vital signs for this encounter:    Current medications as of today   Current Outpatient Medications   Medication Sig Dispense Refill   • nystatin (MYCOSTATIN) 297578 UNIT/ML Suspension Take 5 mL by mouth 4 times a day. 60 mL 0   • ipratropium-albuterol (DUONEB) 0.5-2.5 (3) MG/3ML nebulizer solution 3 mL by Nebulization route every 6 hours as needed for Shortness of Breath for up to 30 doses. 90 mL 1   • montelukast (SINGULAIR) 10 MG Tab Take 1 Tab by mouth every evening. 30 Tab 11   • Mometasone Furoate (ASMANEX 120 METERED DOSES INH) Inhale 1 Inhalation by mouth 2 Times a Day.     • Mometasone Furoate 200 MCG/ACT Aerosol Inhale 1 Puff by mouth every day.     • Fluticasone Propionate (FLONASE NA) Spray 1 Spray in nose every day. Alternating nostrils to prevent nose bleeds     • albuterol 108 (90 Base) MCG/ACT Aero Soln inhalation aerosol Inhale 2 Puffs by mouth every four hours as needed. 1 Inhaler 0   • Fexofenadine HCl (ALLEGRA ALLERGY PO) Take 1 Tab by mouth every day.       No current facility-administered medications for this visit.          Physical Exam:   Gen:           Alert and oriented, No apparent distress. Mood and affect appropriate, normal interaction with examiner.  Eyes:          PERRL, EOM intact, sclere white, conjunctive moist.  Ears:          Not examined.   Hearing:     Grossly intact.  Nose:          Normal, no lesions or  deformities.  Dentition:    Good dentition.  Oropharynx:   Tongue normal, posterior pharynx without erythema or exudate.  Mallampati Classification: 2/3  Neck:        Supple, trachea midline, no masses.  Respiratory Effort: No intercostal retractions or use of accessory muscles.   Lung Auscultation:      Clear to auscultation bilaterally; no rales, rhonchi or wheezing.  CV:            Regular rate and rhythm. No murmurs, rubs or gallops.  Abd:           Not examined. Soft non tender, non distended. Normal active bowel sounds. No masses.  Lymphadenopathy: No palpable nodes or edema.  Gait and Station: Normal.  Digits and Nails: No clubbing, cyanosis, petechiae, or nodes.   Cranial Nerves: II-XII grossly intact.  Skin:        No rashes, lesions or ulcers noted.               Ext:           No cyanosis or edema.      Assessment:  1. Mild persistent asthma without complication     2. Environmental allergies     3. BMI 29.0-29.9,adult     4. Nonsmoker         Immunizations:    Flu:recommend  Pneumovax 23:11/6/19  Prevnar 13:not due    Plan:  1. Asthma is clinically stable but his main complaint is sinus congestion.  He will continue sinus rinse 1-2 times daily followed by Flonase.  He will start Z-Delfin until completed.  He will restart nystatin swish/swallow although thrush not noted on exam.  Rx is sent to pharmacy.  Continue Asmanex Twisthaler 220 mcg 2 puffs once daily.  Continue albuterol HFA inhaler as needed.  He has not required his nebulizer.  May add Mucinex for ongoing congestion.  2.  Spirometry next office visit.  3.  Continue with allergy shots.  Avoidance of pets at home, air purifier and respiratory hygiene discussed.  4.  Follow-up in 2 months with spirometry to check symptoms, sooner if needed.    Please note that this dictation was created using voice recognition software. I have made every reasonable attempt to correct obvious errors, but it is possible there are errors of grammar and possibly content  that I did not discover before finalizing the note.        Electronically signed by KALI Wade  on 02/06/20    Isacc Pike M.D.  [address] [place] [address]  [address] [address]  VIA Facsimile: [phone]

## 2020-02-06 NOTE — PROGRESS NOTES
Chief Complaint   Patient presents with   • Follow-Up     Last Seen 1/3/2020       HPI:  Sarabjit Escobar is a 39 y.o. year old male here today for follow-up on asthma.    At last OV he was switched to Breo 200mcg 1 puff daily, singulair qhs, antihistamine QD,  Continuation of allergy shots and prednisone taper given. He used Breo for 14-16 days and found no improvement so returned to Asmanex 220mcg twisthaler 2 puffs once daily. He notes significant improvement. He has obtained a new air purifier. He sleeps in a different room that is unavailable to pets at home. He overall feels better but notes increased sinus congestion since last OV. He did go to  and strep test was negative. Nystatin given but only used for 4 days with no improvement. He continues to have sinus congestion but only using BRYANT 1x daily now and denies chest tightness/wheezing. No sick symptoms. He did travel since last visit and noted congestion during that time.   He feels his asthma is stable but not well enough to restart running.    HX:  Patient moved from [facility] 1.5 years ago and this last July started having significant flares in his asthma.  Patient was noted to have asthma as a child with symptoms resolved by the age of 19 and no longer required inhalers.  He was able to run without issue.  In July he had an upper respiratory infection and treated antibiotics and steroids and then continue to have ongoing flares.  Recent blood work noted significant allergies to cat dander, mites and white [place] tree.  IgE level was normal.  He was referred to allergist.  We do not have any records from allergist but patient notes having significant allergies to many things in [place] and was advised start allergy injections.  He is completed to thus far.  He does have cats at home and is attempting to remove them from the bedroom but is having difficulty with his cats and fiancé.  We reviewed the importance of removing possible  triggers.  He does use a humidifier in the home and an air purifier.  He changes filters and air conditioning and heater regularly.        ROS: As per HPI and otherwise negative if not stated.    Past Medical History:   Diagnosis Date   • History of iron deficiency 6/26/2019   • Shortness of breath    • Wheezing        History reviewed. No pertinent surgical history.    Family History   Problem Relation Age of Onset   • Diabetes Father    • Stroke Paternal Aunt 66        mild   • Cancer Paternal Grandmother 90        pancreatic        Social History     Socioeconomic History   • Marital status: Single     Spouse name: Not on file   • Number of children: Not on file   • Years of education: Not on file   • Highest education level: Not on file   Occupational History   • Not on file   Social Needs   • Financial resource strain: Not on file   • Food insecurity:     Worry: Not on file     Inability: Not on file   • Transportation needs:     Medical: Not on file     Non-medical: Not on file   Tobacco Use   • Smoking status: Never Smoker   • Smokeless tobacco: Never Used   Substance and Sexual Activity   • Alcohol use: Yes     Alcohol/week: 4.2 oz     Types: 5 Cans of beer, 2 Shots of liquor per week     Frequency: 2-3 times a week     Drinks per session: 1 or 2   • Drug use: No   • Sexual activity: Not on file   Lifestyle   • Physical activity:     Days per week: Not on file     Minutes per session: Not on file   • Stress: Not on file   Relationships   • Social connections:     Talks on phone: Not on file     Gets together: Not on file     Attends Hinduism service: Not on file     Active member of club or organization: Not on file     Attends meetings of clubs or organizations: Not on file     Relationship status: Not on file   • Intimate partner violence:     Fear of current or ex partner: Not on file     Emotionally abused: Not on file     Physically abused: Not on file     Forced sexual activity: Not on file   Other  Topics Concern   • Not on file   Social History Narrative   • Not on file       Allergies as of 02/06/2020   • (No Known Allergies)        Vitals:  @Vital signs for this encounter:    Current medications as of today   Current Outpatient Medications   Medication Sig Dispense Refill   • nystatin (MYCOSTATIN) 661189 UNIT/ML Suspension Take 5 mL by mouth 4 times a day. 60 mL 0   • ipratropium-albuterol (DUONEB) 0.5-2.5 (3) MG/3ML nebulizer solution 3 mL by Nebulization route every 6 hours as needed for Shortness of Breath for up to 30 doses. 90 mL 1   • montelukast (SINGULAIR) 10 MG Tab Take 1 Tab by mouth every evening. 30 Tab 11   • Mometasone Furoate (ASMANEX 120 METERED DOSES INH) Inhale 1 Inhalation by mouth 2 Times a Day.     • Mometasone Furoate 200 MCG/ACT Aerosol Inhale 1 Puff by mouth every day.     • Fluticasone Propionate (FLONASE NA) Spray 1 Spray in nose every day. Alternating nostrils to prevent nose bleeds     • albuterol 108 (90 Base) MCG/ACT Aero Soln inhalation aerosol Inhale 2 Puffs by mouth every four hours as needed. 1 Inhaler 0   • Fexofenadine HCl (ALLEGRA ALLERGY PO) Take 1 Tab by mouth every day.       No current facility-administered medications for this visit.          Physical Exam:   Gen:           Alert and oriented, No apparent distress. Mood and affect appropriate, normal interaction with examiner.  Eyes:          PERRL, EOM intact, sclere white, conjunctive moist.  Ears:          Not examined.   Hearing:     Grossly intact.  Nose:          Normal, no lesions or deformities.  Dentition:    Good dentition.  Oropharynx:   Tongue normal, posterior pharynx without erythema or exudate.  Mallampati Classification: 2/3  Neck:        Supple, trachea midline, no masses.  Respiratory Effort: No intercostal retractions or use of accessory muscles.   Lung Auscultation:      Clear to auscultation bilaterally; no rales, rhonchi or wheezing.  CV:            Regular rate and rhythm. No murmurs, rubs or  gallops.  Abd:           Not examined. Soft non tender, non distended. Normal active bowel sounds. No masses.  Lymphadenopathy: No palpable nodes or edema.  Gait and Station: Normal.  Digits and Nails: No clubbing, cyanosis, petechiae, or nodes.   Cranial Nerves: II-XII grossly intact.  Skin:        No rashes, lesions or ulcers noted.               Ext:           No cyanosis or edema.      Assessment:  1. Mild persistent asthma without complication     2. Environmental allergies     3. BMI 29.0-29.9,adult     4. Nonsmoker         Immunizations:    Flu:recommend  Pneumovax 23:11/6/19  Prevnar 13:not due    Plan:  1. Asthma is clinically stable but his main complaint is sinus congestion.  He will continue sinus rinse 1-2 times daily followed by Flonase.  He will start Z-Delfin until completed.  He will restart nystatin swish/swallow although thrush not noted on exam.  Rx is sent to pharmacy.  Continue Asmanex Twisthaler 220 mcg 2 puffs once daily.  Continue albuterol HFA inhaler as needed.  He has not required his nebulizer.  May add Mucinex for ongoing congestion.  2.  Spirometry next office visit.  3.  Continue with allergy shots.  Avoidance of pets at home, air purifier and respiratory hygiene discussed.  4.  Follow-up in 2 months with spirometry to check symptoms, sooner if needed.    Please note that this dictation was created using voice recognition software. I have made every reasonable attempt to correct obvious errors, but it is possible there are errors of grammar and possibly content that I did not discover before finalizing the note.

## 2020-03-06 DIAGNOSIS — J30.2 SEASONAL ALLERGIES: ICD-10-CM

## 2020-03-06 DIAGNOSIS — J98.01 ACUTE BRONCHOSPASM: ICD-10-CM

## 2020-03-06 DIAGNOSIS — J45.21 MILD INTERMITTENT ASTHMA WITH ACUTE EXACERBATION: ICD-10-CM

## 2020-03-07 NOTE — TELEPHONE ENCOUNTER
Pt requested via [employer]  Have we ever prescribed this med? Yes.  If yes, what date? 02/06/2020(Anthony)    Last OV: 02/06/2020 with Danitza GUERRERO    Next OV: 05/14/2020 with Danitza GUERRERO    DX: Acute bronchospasm (J98.01); Mild intermittent asthma with acute exacerbation (J45.21); Seasonal allergies (J30.2    Medications:   Requested Prescriptions     Pending Prescriptions Disp Refills   • ipratropium-albuterol (DUONEB) 0.5-2.5 (3) MG/3ML nebulizer solution 90 mL 1     Sig: 3 mL by Nebulization route every 6 hours as needed for Shortness of Breath for up to 30 doses.   • mometasone (ASMANEX, 60 METERED DOSES,) 220 MCG/INH inhaler 1 Inhaler 11     Sig: Inhale 2 Puffs by mouth every day. Rinse mouth after each use.   • albuterol 108 (90 Base) MCG/ACT Aero Soln inhalation aerosol 1 Inhaler 2     Sig: Inhale 2 Puffs by mouth every four hours as needed.

## 2020-03-09 RX ORDER — IPRATROPIUM BROMIDE AND ALBUTEROL SULFATE 2.5; .5 MG/3ML; MG/3ML
3 SOLUTION RESPIRATORY (INHALATION) EVERY 6 HOURS PRN
Qty: 90 ML | Refills: 5 | Status: SHIPPED | OUTPATIENT
Start: 2020-03-09 | End: 2022-07-07

## 2020-03-09 RX ORDER — ALBUTEROL SULFATE 90 UG/1
2 AEROSOL, METERED RESPIRATORY (INHALATION) EVERY 4 HOURS PRN
Qty: 1 INHALER | Refills: 5 | Status: SHIPPED | OUTPATIENT
Start: 2020-03-09

## 2020-03-19 ENCOUNTER — TELEPHONE (OUTPATIENT)
Dept: HEALTH INFORMATION MANAGEMENT | Facility: OTHER | Age: 40
End: 2020-03-19

## 2020-03-19 NOTE — TELEPHONE ENCOUNTER
1. Caller Name: Sarabjit                       Call Back Number: cell  [facility] PCP or Specialty Provider: Yes Dr. Li        2.  Does patient have any active symptoms of respiratory illness (fever OR cough OR shortness of breath)? Yes, the patient reports the following respiratory symptoms: cough and shortness of breath. Asthma sys have worsened with the recent weather change. He states that he last had a medication change back on 2/20 with no noted improvements and he wanted to follow up with Dr. Li regarding his plan of care.     3.  Does patient have any comoribidities? None  Asthma      4.  In the last 30 days, has the patient traveled outside of the country OR in a high risk area within the  OR have any known contact with someone who has or is suspected to have COVID-19?  Yes, [place]. He reports that he already had post nasal drip and mucus symptoms when he went to [place] 2/25-2/26.     5. POTENTIAL PUI (LOW): Advised to perform self care, monitor for worsening symptoms and to call back in 3 days if no improvement      Note routed to PCP: Provider action needed: Patient has a follow up appt upcoming 3/23 and is wondering if it is okay for him to come in and see you to discuss his asthma sys as he feels they are not well controlled at this time. He also understands if he needs to reschedule his appt or do a phone visit as he has active respiratory symptoms, although they do appear to be acute on chronic in nature.  Thank you!

## 2020-03-20 ENCOUNTER — TELEPHONE (OUTPATIENT)
Dept: HEALTH INFORMATION MANAGEMENT | Facility: OTHER | Age: 40
End: 2020-03-20

## 2020-03-20 NOTE — TELEPHONE ENCOUNTER
1. Caller Name: Sarabjit Whyte                        Call Back Number: [phone]  [facility] PCP or Specialty Provider: Lorenzo Li        2.  Does patient have any active symptoms of respiratory illness (fever OR cough OR shortness of breath)? Yes, the patient reports the following respiratory symptoms: asthma symptoms, sore throat, since January - .  Post-nasal drip, no fever, not different from normal asthma symptoms, relieved by Rx (albuterol) nubulizer. Also takes asminex, flonase, singulair, allergra    3.  Does patient have any comoribidities? None     4.  In the last 30 days, has the patient traveled outside of the country OR in a high risk area within the  OR have any known contact with someone who has or is suspected to have COVID-19?  [place], [place], not out of country, [place] Feb 25th. Works at [employer], in contact with  Anyone that he knows    5. Disposition: Cleared by RN Triage; OK to keep/schedule appointment    Note routed to PCP: [facility]HE[facility] only.

## 2020-06-22 ENCOUNTER — TELEPHONE (OUTPATIENT)
Dept: HEALTH INFORMATION MANAGEMENT | Facility: OTHER | Age: 40
End: 2020-06-22

## 2020-06-22 NOTE — TELEPHONE ENCOUNTER
1. Caller Name:Sarabjit Escobar             Call Back Number: [phone]  [facility] PCP or Specialty Provider: Yes      2.  In the last two weeks, has the patient had any new or worsening symptoms (not explained by alternative diagnosis)? Yes, the patient reports the following COVID-19 consistent symptoms: shortness of breath or difficulty breathing, exercise induced. History of asthma. Since may of last year.     3.  Does patient have any comoribidities? Asthma for 30 years.     4.  Has the patient traveled in the last 14 days OR had any known contact with someone who is suspected or confirmed to have COVID-19?  No.    5. Disposition: Cleared by RN Triage as potential is low for COVID-19; OK to keep/schedule appointment    Note routed to [facility] Provider: [facility] only.

## 2020-06-23 ENCOUNTER — OFFICE VISIT (OUTPATIENT)
Dept: MEDICAL GROUP | Facility: PHYSICIAN GROUP | Age: 40
End: 2020-06-23
Payer: COMMERCIAL

## 2020-06-23 VITALS
BODY MASS INDEX: 29.05 KG/M2 | SYSTOLIC BLOOD PRESSURE: 128 MMHG | HEART RATE: 88 BPM | HEIGHT: 73 IN | TEMPERATURE: 98.9 F | OXYGEN SATURATION: 96 % | DIASTOLIC BLOOD PRESSURE: 88 MMHG | WEIGHT: 219.2 LBS

## 2020-06-23 DIAGNOSIS — F41.9 ANXIETY: ICD-10-CM

## 2020-06-23 DIAGNOSIS — Z91.09 ENVIRONMENTAL ALLERGIES: ICD-10-CM

## 2020-06-23 PROBLEM — J45.40 MODERATE PERSISTENT ASTHMA WITHOUT COMPLICATION: Status: ACTIVE | Noted: 2019-09-18

## 2020-06-23 PROCEDURE — 99214 OFFICE O/P EST MOD 30 MIN: CPT | Performed by: FAMILY MEDICINE

## 2020-06-23 RX ORDER — CETIRIZINE HYDROCHLORIDE 10 MG/1
TABLET ORAL
COMMUNITY
Start: 2020-06-08 | End: 2022-08-16

## 2020-06-23 RX ORDER — ESCITALOPRAM OXALATE 10 MG/1
10 TABLET ORAL DAILY
Qty: 30 TAB | Refills: 5 | Status: SHIPPED | OUTPATIENT
Start: 2020-06-23 | End: 2020-07-09 | Stop reason: SDUPTHER

## 2020-06-23 RX ORDER — MOMETASONE FUROATE 200 UG/1
AEROSOL RESPIRATORY (INHALATION)
COMMUNITY
Start: 2020-06-01 | End: 2020-06-23

## 2020-06-23 RX ORDER — AZELASTINE 1 MG/ML
1 SPRAY, METERED NASAL 2 TIMES DAILY
Qty: 30 ML | Refills: 5 | Status: SHIPPED | OUTPATIENT
Start: 2020-06-23 | End: 2022-07-07

## 2020-06-23 ASSESSMENT — PATIENT HEALTH QUESTIONNAIRE - PHQ9: CLINICAL INTERPRETATION OF PHQ2 SCORE: 0

## 2020-06-23 ASSESSMENT — FIBROSIS 4 INDEX: FIB4 SCORE: 0.91

## 2020-06-23 NOTE — PROGRESS NOTES
cc: anxiety       Subjective:     Sarabjit Escobar is a 39 y.o. male presenting for the following:     Asthma: patient is seeing an allergy and asthma specialist.  Overall his asthma symptoms are controlled.  He is not getting shortness of breath or chronic cough any longer.  However, he still does have seasonal allergies and he is allergic to many trees in [place].  He used to like exercising outside but he can now not do that or else he will get a lot of mucus and nasal congestion.  This has not been helpful for his mood.    Anxiety: Patient has had difficulty with anxiety in the past.  He did used to have a more stressful job that he did not like and he did struggle more then.  However, this had improved in his early 30s.  But now, it was worse as he has been working from home and he is gaining some weight.  He admits he has having daily, almost constant worry.  He does note that he worries a lot about things he cannot control such as environmental change and COVID-19.  He denies any suicidal or homicidal ideation.  He is having difficulty with concentration and decision making.        Review of systems:  All others reviewed and are negative.       Current Outpatient Medications:   •  cetirizine (ZYRTEC ALLERGY) 10 MG Tab, , Disp: , Rfl:   •  escitalopram (LEXAPRO) 10 MG Tab, Take 1 Tab by mouth every day., Disp: 30 Tab, Rfl: 5  •  azelastine (ASTELIN) 137 MCG/SPRAY nasal spray, Spray 1 Spray in nose 2 times a day., Disp: 30 mL, Rfl: 5  •  ipratropium-albuterol (DUONEB) 0.5-2.5 (3) MG/3ML nebulizer solution, 3 mL by Nebulization route every 6 hours as needed for Shortness of Breath for up to 30 doses., Disp: 90 mL, Rfl: 5  •  mometasone (ASMANEX, 60 METERED DOSES,) 220 MCG/INH inhaler, Inhale 2 Puffs by mouth every day. Rinse mouth after each use., Disp: 1 Inhaler, Rfl: 5  •  albuterol 108 (90 Base) MCG/ACT Aero Soln inhalation aerosol, Inhale 2 Puffs by mouth every four hours as needed., Disp: 1 Inhaler,  "Rfl: 5  •  montelukast (SINGULAIR) 10 MG Tab, Take 1 Tab by mouth every evening., Disp: 30 Tab, Rfl: 11  •  Fluticasone Propionate (FLONASE NA), Spray 1 Spray in nose every day. Alternating nostrils to prevent nose bleeds, Disp: , Rfl:     Allergies, past medical history, past surgical history, family history, social history reviewed and updated    Objective:     Vitals: /88 (BP Location: Left arm, Patient Position: Sitting, BP Cuff Size: Large adult)   Pulse 88   Temp 37.2 °C (98.9 °F) (Temporal)   Ht 1.854 m (6' 1\")   Wt 99.4 kg (219 lb 3.2 oz)   SpO2 96%   BMI 28.92 kg/m²   General: Alert, pleasant, NAD  HEENT: Normocephalic.   EOMI, no icterus or pallor.    Neck supple.  No thyromegaly or masses palpated. No cervical or supraclavicular lymphadenopathy.  Heart: Regular rate and rhythm.  S1 and S2 normal.  No murmurs appreciated.  Respiratory: Normal respiratory effort.  Clear to auscultation bilaterally.  Psych:  Affect is normal, judgement is good, grooming is appropriate.    Assessment/Plan:     Sarabjit was seen today for follow-up.    Diagnoses and all orders for this visit:    Anxiety: Chronic problem that is flaring now after some years.  Will trial Lexapro, patient warned of common side effects of this.  Also suggest mindfulness meditation.  Patient would be interested in speaking to a therapist if these things were not effective.  Will place referral to behavioral health.  To let me know if this problem does not improve over the next 1-2 months.  -     escitalopram (LEXAPRO) 10 MG Tab; Take 1 Tab by mouth every day.  -     REFERRAL TO BEHAVIORAL HEALTH    Environmental allergies: This is limiting patient's exercise.  He does feel that his asthma is well controlled.  To suggest Astelin nasal spray prior to exercise outside.  -     azelastine (ASTELIN) 137 MCG/SPRAY nasal spray; Spray 1 Spray in nose 2 times a day.        Return in about 3 months (around 9/23/2020), or if symptoms worsen or fail to " improve.

## 2020-06-24 RX ORDER — ESCITALOPRAM OXALATE 5 MG/1
5 TABLET ORAL 2 TIMES DAILY
Qty: 60 TAB | Refills: 0 | Status: SHIPPED | OUTPATIENT
Start: 2020-06-24 | End: 2020-07-24

## 2020-07-07 DIAGNOSIS — F41.9 ANXIETY: ICD-10-CM

## 2020-07-09 RX ORDER — ESCITALOPRAM OXALATE 5 MG/1
5 TABLET ORAL 2 TIMES DAILY
Qty: 180 TAB | Refills: 1 | OUTPATIENT
Start: 2020-07-09 | End: 2020-08-08

## 2020-07-09 RX ORDER — ESCITALOPRAM OXALATE 10 MG/1
10 TABLET ORAL DAILY
Qty: 90 TAB | Refills: 1 | Status: SHIPPED | OUTPATIENT
Start: 2020-07-09 | End: 2022-07-07

## 2020-07-09 NOTE — TELEPHONE ENCOUNTER
Patient on 10mg lexapro daily. Switched to 5mg BID for one month, as pharmacy did not have 10mg tabs in stock. Will now go back to 10mg daily.

## 2021-02-05 ENCOUNTER — OFFICE VISIT (OUTPATIENT)
Dept: URGENT CARE | Facility: PHYSICIAN GROUP | Age: 41
End: 2021-02-05
Payer: COMMERCIAL

## 2021-02-05 VITALS
TEMPERATURE: 97.1 F | BODY MASS INDEX: 27.59 KG/M2 | OXYGEN SATURATION: 97 % | HEART RATE: 72 BPM | SYSTOLIC BLOOD PRESSURE: 150 MMHG | RESPIRATION RATE: 16 BRPM | DIASTOLIC BLOOD PRESSURE: 102 MMHG | WEIGHT: 215 LBS | HEIGHT: 74 IN

## 2021-02-05 DIAGNOSIS — R06.4 ACUTE HYPERVENTILATION: ICD-10-CM

## 2021-02-05 DIAGNOSIS — J20.9 ACUTE BRONCHITIS, UNSPECIFIED ORGANISM: ICD-10-CM

## 2021-02-05 DIAGNOSIS — R03.0 ELEVATED BLOOD PRESSURE READING WITHOUT DIAGNOSIS OF HYPERTENSION: ICD-10-CM

## 2021-02-05 DIAGNOSIS — J45.40 MODERATE PERSISTENT ASTHMA WITHOUT COMPLICATION: ICD-10-CM

## 2021-02-05 DIAGNOSIS — F41.9 ANXIETY: ICD-10-CM

## 2021-02-05 PROCEDURE — 99214 OFFICE O/P EST MOD 30 MIN: CPT | Performed by: NURSE PRACTITIONER

## 2021-02-05 RX ORDER — AMOXICILLIN AND CLAVULANATE POTASSIUM 875; 125 MG/1; MG/1
1 TABLET, FILM COATED ORAL 2 TIMES DAILY
Qty: 14 TAB | Refills: 0 | Status: SHIPPED | OUTPATIENT
Start: 2021-02-05 | End: 2022-07-07

## 2021-02-05 RX ORDER — METHYLPREDNISOLONE 4 MG/1
TABLET ORAL
Qty: 21 TAB | Refills: 0 | Status: SHIPPED | OUTPATIENT
Start: 2021-02-05 | End: 2022-07-07

## 2021-02-05 ASSESSMENT — ENCOUNTER SYMPTOMS
NAUSEA: 0
SHORTNESS OF BREATH: 1
ORTHOPNEA: 0
EYE DISCHARGE: 0
HEADACHES: 0
CHILLS: 0
MYALGIAS: 0
DIARRHEA: 0
SORE THROAT: 0
SPUTUM PRODUCTION: 1
FEVER: 0
WHEEZING: 0
COUGH: 1

## 2021-02-05 ASSESSMENT — FIBROSIS 4 INDEX: FIB4 SCORE: 0.94

## 2021-02-06 NOTE — PROGRESS NOTES
"Subjective:      Alexy Escobar is a 40 y.o. male who presents with Asthma (asthma issues( [place] seasonal) )               HPI: New. 40 year old male with congestion and some \"shortness of breath\" and breathing issues. States difficulty taking and getting a full deep breath. Denies fever, chills, myalgia or wheezing. Denies nausea or diarrhea. He has no exposure to covid, staying mostly home. Using his asthma medication without much relief.     Patient has no known allergies.  Current Outpatient Medications on File Prior to Visit   Medication Sig Dispense Refill   • cetirizine (ZYRTEC ALLERGY) 10 MG Tab      • ipratropium-albuterol (DUONEB) 0.5-2.5 (3) MG/3ML nebulizer solution 3 mL by Nebulization route every 6 hours as needed for Shortness of Breath for up to 30 doses. 90 mL 5   • mometasone (ASMANEX, 60 METERED DOSES,) 220 MCG/INH inhaler Inhale 2 Puffs by mouth every day. Rinse mouth after each use. 1 Inhaler 5   • albuterol 108 (90 Base) MCG/ACT Aero Soln inhalation aerosol Inhale 2 Puffs by mouth every four hours as needed. 1 Inhaler 5   • montelukast (SINGULAIR) 10 MG Tab Take 1 Tab by mouth every evening. 30 Tab 11   • Fluticasone Propionate (FLONASE NA) Spray 1 Spray in nose every day. Alternating nostrils to prevent nose bleeds     • escitalopram (LEXAPRO) 10 MG Tab Take 1 Tab by mouth every day. (Patient not taking: Reported on 2/5/2021) 90 Tab 1   • azelastine (ASTELIN) 137 MCG/SPRAY nasal spray [place] 1 Spray in nose 2 times a day. (Patient not taking: Reported on 2/5/2021) 30 mL 5     No current facility-administered medications on file prior to visit.      Social History     Socioeconomic History   • Marital status: Single     Spouse name: Not on file   • Number of children: Not on file   • Years of education: Not on file   • Highest education level: Not on file   Occupational History   • Not on file   Social Needs   • Financial resource strain: Not on file   • Food insecurity     Worry: Not " "on file     Inability: Not on file   • Transportation needs     Medical: Not on file     Non-medical: Not on file   Tobacco Use   • Smoking status: Never Smoker   • Smokeless tobacco: Never Used   Substance and Sexual Activity   • Alcohol use: Yes     Alcohol/week: 4.2 oz     Types: 5 Cans of beer, 2 Shots of liquor per week     Frequency: 2-3 times a week     Drinks per session: 1 or 2   • Drug use: No   • Sexual activity: Not on file   Lifestyle   • Physical activity     Days per week: Not on file     Minutes per session: Not on file   • Stress: Not on file   Relationships   • Social connections     Talks on phone: Not on file     Gets together: Not on file     Attends [employer] service: Not on file     Active member of club or organization: Not on file     Attends meetings of clubs or organizations: Not on file     Relationship status: Not on file   • Intimate partner violence     Fear of current or ex partner: Not on file     Emotionally abused: Not on file     Physically abused: Not on file     Forced sexual activity: Not on file   Other Topics Concern   • Not on file   Social History Narrative   • Not on file     Breast Cancer-related family history is not on file.      Review of Systems   Constitutional: Positive for malaise/fatigue. Negative for chills and fever.   HENT: Positive for congestion. Negative for sore throat.    Eyes: Negative for discharge.   Respiratory: Positive for cough, sputum production and shortness of breath. Negative for wheezing.    Cardiovascular: Negative for chest pain and orthopnea.   Gastrointestinal: Negative for diarrhea and nausea.   Musculoskeletal: Negative for myalgias.   Neurological: Negative for headaches.   Endo/Heme/Allergies: Negative for environmental allergies.          Objective:     /102 (BP Location: Right arm, Patient Position: Sitting, BP Cuff Size: Adult)   Pulse 72   Temp 36.2 °C (97.1 °F) (Temporal)   Resp 16   Ht 1.88 m (6' 2\") Comment: per pt  Wt " 97.5 kg (215 lb) Comment: per pt  SpO2 97%   BMI 27.60 kg/m²      Physical Exam  Vitals signs and nursing note reviewed.   Constitutional:       General: He is not in acute distress.     Appearance: Normal appearance. He is well-developed.      Comments: anxious   HENT:      Head: Normocephalic.      Right Ear: Tympanic membrane and external ear normal.      Left Ear: Tympanic membrane and external ear normal.      Nose: Mucosal edema present.      Mouth/Throat:      Pharynx: No posterior oropharyngeal erythema.   Eyes:      General:         Right eye: No discharge.         Left eye: No discharge.      Conjunctiva/sclera: Conjunctivae normal.   Neck:      Musculoskeletal: Normal range of motion and neck supple.   Cardiovascular:      Rate and Rhythm: Normal rate and regular rhythm.      Heart sounds: Normal heart sounds.   Pulmonary:      Effort: Pulmonary effort is normal.      Breath sounds: Normal breath sounds. No wheezing.   Musculoskeletal: Normal range of motion.   Lymphadenopathy:      Cervical: No cervical adenopathy.   Skin:     General: Skin is warm and dry.   Neurological:      Mental Status: He is alert and oriented to person, place, and time.   Psychiatric:         Behavior: Behavior normal.         Thought Content: Thought content normal.                 Assessment/Plan:        1. Acute bronchitis, unspecified organism  amoxicillin-clavulanate (AUGMENTIN) 875-125 MG Tab    methylPREDNISolone (MEDROL DOSEPAK) 4 MG Tablet Therapy Pack   2. Moderate persistent asthma without complication     3. Elevated blood pressure reading without diagnosis of hypertension     4. Anxiety     5. Acute hyperventilation       Elevated blood pressure- patient states white coat syndrome.  I truly believe his respiratory issue is related to anxiety. Suggested brown bag for hyperventilation.  Very anxious here in clinic. Reassured patient that covid numbers are dropping.  Urged to reach out with any concerns.

## 2021-10-25 ENCOUNTER — OFFICE VISIT (OUTPATIENT)
Dept: URGENT CARE | Facility: CLINIC | Age: 41
End: 2021-10-25
Payer: COMMERCIAL

## 2021-10-25 VITALS
SYSTOLIC BLOOD PRESSURE: 152 MMHG | DIASTOLIC BLOOD PRESSURE: 98 MMHG | TEMPERATURE: 97.5 F | HEART RATE: 78 BPM | OXYGEN SATURATION: 98 % | RESPIRATION RATE: 18 BRPM

## 2021-10-25 DIAGNOSIS — J01.10 ACUTE NON-RECURRENT FRONTAL SINUSITIS: ICD-10-CM

## 2021-10-25 PROCEDURE — 99214 OFFICE O/P EST MOD 30 MIN: CPT | Performed by: PHYSICIAN ASSISTANT

## 2021-10-25 RX ORDER — FLUTICASONE PROPIONATE 220 UG/1
AEROSOL, METERED RESPIRATORY (INHALATION)
COMMUNITY
Start: 2021-10-19

## 2021-10-25 RX ORDER — AMOXICILLIN AND CLAVULANATE POTASSIUM 875; 125 MG/1; MG/1
1 TABLET, FILM COATED ORAL 2 TIMES DAILY
Qty: 14 TABLET | Refills: 0 | Status: SHIPPED | OUTPATIENT
Start: 2021-10-25 | End: 2021-11-01

## 2021-10-25 ASSESSMENT — ENCOUNTER SYMPTOMS
COUGH: 1
SINUS PRESSURE: 1
HEADACHES: 0
SHORTNESS OF BREATH: 0
CHILLS: 0
SORE THROAT: 1

## 2021-10-26 NOTE — PROGRESS NOTES
Subjective:   Sarabjit Escobar is a 40 y.o. male who presents for Sinus Pain (x5days, eye irritation, sinus pain, congestion, post nasal drip, productive cough)      Sinusitis  This is a new problem. The current episode started in the past 7 days. The problem has been gradually worsening since onset. There has been no fever. The pain is mild. Associated symptoms include congestion, coughing (post nasal drip ), sinus pressure and a sore throat. Pertinent negatives include no chills, ear pain, headaches or shortness of breath. Past treatments include oral decongestants. The treatment provided moderate relief.   history of a sinus infection.     Medications:    • albuterol Aers  • amoxicillin-clavulanate Tabs  • Asmanex (60 Metered Doses) Aepb  • azelastine  • cetirizine Tabs  • escitalopram Tabs  • FLONASE NA  • Flovent HFA Aero  • ipratropium-albuterol  • methylPREDNISolone Tbpk  • montelukast Tabs  • OMEPRAZOLE PO    Allergies: Patient has no known allergies.    Problem List: Sarabjit Escobar does not have any pertinent problems on file.    Surgical History:  No past surgical history on file.    Past Social Hx: Sarabjit Escobar  reports that he has never smoked. He has never used smokeless tobacco. He reports current alcohol use of about 4.2 oz of alcohol per week. He reports that he does not use drugs.     Past Family Hx:  Sarabjit Escobar family history includes Cancer (age of onset: 90) in his paternal grandmother; Diabetes in his father; Stroke (age of onset: 66) in his paternal aunt.     Problem list, medications, and allergies reviewed by myself today in Epic.     Objective:     /98 (BP Location: Left arm, Patient Position: Sitting, BP Cuff Size: Adult)   Pulse 78   Temp 36.4 °C (97.5 °F) (Temporal)   Resp 18   SpO2 98%     Physical Exam  Vitals reviewed.   Constitutional:       General: He is not in acute distress.     Appearance: Normal appearance. He is not  ill-appearing or toxic-appearing.   HENT:      Right Ear: Tympanic membrane normal.      Left Ear: Tympanic membrane normal.      Nose: Mucosal edema and rhinorrhea present. Rhinorrhea is purulent.      Right Sinus: No maxillary sinus tenderness or frontal sinus tenderness.      Left Sinus: Frontal sinus tenderness present. No maxillary sinus tenderness.      Mouth/Throat:      Mouth: Mucous membranes are moist.      Pharynx: Oropharynx is clear. No oropharyngeal exudate or posterior oropharyngeal erythema.   Eyes:      Conjunctiva/sclera: Conjunctivae normal.      Pupils: Pupils are equal, round, and reactive to light.   Cardiovascular:      Rate and Rhythm: Normal rate and regular rhythm.      Heart sounds: Normal heart sounds.   Pulmonary:      Effort: Pulmonary effort is normal. No respiratory distress.      Breath sounds: Normal breath sounds. No wheezing, rhonchi or rales.   Musculoskeletal:      Cervical back: Neck supple.   Skin:     General: Skin is warm and dry.   Neurological:      General: No focal deficit present.      Mental Status: He is alert and oriented to person, place, and time.   Psychiatric:         Mood and Affect: Mood normal.         Behavior: Behavior normal.         Diagnosis and associated orders:     1. Acute non-recurrent frontal sinusitis  amoxicillin-clavulanate (AUGMENTIN) 875-125 MG Tab        Comments/MDM:     Discussed patient's signs and symptoms are consistent with sinus.   Contingent antibiotic prescription given to patient to fill upon meeting criteria of strict guidelines discussed in length.   Encouraged plenty of fluids, Flonase, nasal saline washes or curt pot, Mucinex, Ibuprofen and Tylenol for pain. They may take a non-drowsy oral antihistamine in the morning.   Patient currently awaiting COVID PCR test result.   Advised to return to the clinic or present to the ED if any worsening symptoms or fever, chills, vision changes, difficulty breathing, or any other concerns.           I personally reviewed prior external notes and test results pertinent to today's visit. Supportive care, natural history, differential diagnoses, and indications for immediate follow-up discussed. Patient expresses understanding and agrees to plan. Patient denies any other questions or concerns.     Follow-up with the primary care physician for recheck, reevaluation, and consideration of further management.    Time spent evaluating the patient was 30 minutes which included preparing for the visit, obtaining history, examination, ordering labs/tests/procedures/medications, independent interpretation, discussion of plan, counseling/education, medical information reconciliation, and documentation into chart.     Please note that this dictation was created using voice recognition software. I have made a reasonable attempt to correct obvious errors, but I expect that there are errors of grammar and possibly content that I did not discover before finalizing the note.    This note was electronically signed by Anup Patten PA-C

## 2022-07-07 ENCOUNTER — TELEMEDICINE (OUTPATIENT)
Dept: MEDICAL GROUP | Facility: PHYSICIAN GROUP | Age: 42
End: 2022-07-07
Payer: COMMERCIAL

## 2022-07-07 VITALS
HEART RATE: 67 BPM | HEIGHT: 74 IN | TEMPERATURE: 98.1 F | WEIGHT: 222 LBS | BODY MASS INDEX: 28.49 KG/M2 | OXYGEN SATURATION: 98 %

## 2022-07-07 DIAGNOSIS — F41.1 GAD (GENERALIZED ANXIETY DISORDER): ICD-10-CM

## 2022-07-07 DIAGNOSIS — E78.41 ELEVATED LIPOPROTEIN(A): ICD-10-CM

## 2022-07-07 DIAGNOSIS — Z91.09 ENVIRONMENTAL ALLERGIES: ICD-10-CM

## 2022-07-07 DIAGNOSIS — J45.40 MODERATE PERSISTENT ASTHMA WITHOUT COMPLICATION: ICD-10-CM

## 2022-07-07 DIAGNOSIS — I10 ESSENTIAL HYPERTENSION: ICD-10-CM

## 2022-07-07 DIAGNOSIS — U07.1 COVID-19: ICD-10-CM

## 2022-07-07 DIAGNOSIS — F32.4 MAJOR DEPRESSIVE DISORDER IN PARTIAL REMISSION, UNSPECIFIED WHETHER RECURRENT (HCC): ICD-10-CM

## 2022-07-07 DIAGNOSIS — Z00.00 ENCOUNTER FOR MEDICAL EXAMINATION TO ESTABLISH CARE: ICD-10-CM

## 2022-07-07 PROBLEM — R05.9 COUGH: Status: RESOLVED | Noted: 2020-01-03 | Resolved: 2022-07-07

## 2022-07-07 PROBLEM — F41.9 ANXIETY: Status: RESOLVED | Noted: 2020-06-23 | Resolved: 2022-07-07

## 2022-07-07 PROCEDURE — 99214 OFFICE O/P EST MOD 30 MIN: CPT | Mod: 95

## 2022-07-07 RX ORDER — CETIRIZINE HYDROCHLORIDE 10 MG/1
10 TABLET ORAL
COMMUNITY
End: 2022-08-16

## 2022-07-07 RX ORDER — FAMOTIDINE 20 MG
TABLET ORAL
COMMUNITY

## 2022-07-07 RX ORDER — FLUTICASONE PROPIONATE 50 MCG
SPRAY, SUSPENSION (ML) NASAL
COMMUNITY
Start: 2022-04-29

## 2022-07-07 ASSESSMENT — PATIENT HEALTH QUESTIONNAIRE - PHQ9: CLINICAL INTERPRETATION OF PHQ2 SCORE: 0

## 2022-07-07 NOTE — ASSESSMENT & PLAN NOTE
Patient tested positive yesterday for COVID-19.  Patient endorses having severe anxiety about the pandemic since it started.  He states it has eased up a bit, however, he still is feeling anxious about his recent diagnosis.  Patient is considered high risk as he has moderate persistent asthma.  He is inquiring today about an antiviral medication.  His current symptoms are mild to include fatigue and headache.  Patient denies any shortness of breath or dyspnea.  Due to his asthma diagnosis, I will order antiviral Paxlovid.  Patient states he is unsure if he will start this, however, he would like to have a prescription as we had into the weekend in case his symptoms worsen.  Patient was educated on the side effects and contraindications of this medication.  His current medication list was reviewed and there are no interactions. He is not on a LABA nor any systemic corticosteroids for his asthma. Order placed.

## 2022-07-07 NOTE — ASSESSMENT & PLAN NOTE
Patient's states he went through a phase where he was experiencing increased anxiety and depression due to stress from his job.  Patient also states that once he moved to [place], his allergies and asthma became so bad that he was unable to go outside and unable to exercise without experiencing an exacerbation.  This was all contributing to his overall mental health.  Patient was prescribed escitalopram, however, he never started this.  He started doing nonpharmacologic interventions including yoga and mindfulness meditation, which he states he has had good results with.  Patient states today he feels good and denies any need for medication.  > Continue lifestyle modifications at home to support stress relief and decrease anxiety.

## 2022-07-07 NOTE — ASSESSMENT & PLAN NOTE
Patient has chronic asthma, however, it was not until he moved to [place] from [place] that his symptoms worsened, which prevented him from being active outside due to frequent exacerbations.  He is currently followed by Dr Rose of [facility] where he has been receiving monthly allergy shots for the last 2.5 years.   Patient states since receiving the shots, his symptoms have greatly improved.  He states this time of year is normally the worst for him.  However, patient states he is able to go outside and has started running again.  He does use a Flovent inhaler daily as well as his albuterol inhaler about once a month.  Patient also takes Zyrtec, Singulair and uses Flonase spray.  He feels this current regimen is providing good management.      >Continue close follow-up with Dr. Rey.

## 2022-07-07 NOTE — PROGRESS NOTES
Virtual Visit: New Patient   This visit was conducted via Zoom using secure and encrypted videoconferencing technology.   The patient was in their home in the [facility].    The patient's identity was confirmed and verbal consent was obtained for this virtual visit.    Subjective:     CC:   Chief Complaint   Patient presents with   • [facility]     Sarabjit Escobar is a 41 y.o. male presenting to establish care and to discuss the evaluation and management of :    Patient Active Problem List   Diagnosis   • Moderate persistent asthma without complication   • Environmental allergies   • Essential hypertension   • MARY (generalized anxiety disorder)   • MDD (major depressive disorder)   • COVID-19     Moderate persistent asthma without complication  Environmental Allergies  Patient has chronic asthma, however, it was not until he moved to [place] from [place] that his symptoms worsened, which prevented him from being active outside due to frequent exacerbations.  He is currently followed by Dr Rose of [facility] where he has been receiving monthly allergy shots for the last 2.5 years.   Patient states since receiving the shots, his symptoms have greatly improved.  He states this time of year is normally the worst for him.  However, patient states he is able to go outside and has started running again.  He does use a Flovent inhaler daily as well as his albuterol inhaler about once a month.  Patient also takes Zyrtec, Singulair and uses Flonase spray.  He feels this current regimen is providing good management.      MARY (generalized anxiety disorder)  Patient's states he went through a phase where he was experiencing increased anxiety and depression due to stress from his job.  Patient also states that once he moved to [place], his allergies and asthma became so bad that he was unable to go outside and unable to exercise without experiencing an exacerbation.  This was all contributing  to his overall mental health.  Patient was prescribed escitalopram, however, he never started this.  He started doing nonpharmacologic interventions including yoga and mindfulness meditation, which he states he has had good results with.  Patient states today he feels good and denies any need for medication.    Essential hypertension  Patient states he has a long history of whitecoat syndrome and legitimately gets anxious when he goes to the doctor.  He states he periodically will check his blood pressure at home and it is always normal.  Patient states of late, he is worked on [employer] and has started exercising more, which he thinks may help his blood pressure even more.  Today is a virtual visit due to a positive COVID test and so we do not have a blood pressure available to review, however, patient will start using his blood pressure cuff at home to monitor more closely.     COVID-19  Patient tested positive yesterday for COVID-19.  Patient endorses having severe anxiety about the pandemic since it started.  He states it has eased up a bit, however, he still is feeling anxious about his recent diagnosis.  Patient is considered high risk as he has moderate persistent asthma.  He is inquiring today about an antiviral medication.  His current symptoms are mild to include fatigue and headache.  Patient denies any shortness of breath or dyspnea.        ROS  See HPI    Current medicines (including changes today)  Current Outpatient Medications   Medication Sig Dispense Refill   • Nirmatrelvir & Ritonavir 20 x 150 MG & 10 x 100MG Tablet Therapy Pack Take 300 mg nirmatrelvir (two 150 mg tablets) with 100 mg ritonavir (one 100 mg tablet) by mouth, with all three tablets taken together twice daily for 5 days. 30 Each 0   • FLOVENT  MCG/ACT Aerosol      • cetirizine (ZYRTEC) 10 MG Tab      • albuterol 108 (90 Base) MCG/ACT Aero Soln inhalation aerosol Inhale 2 Puffs by mouth every four hours as needed. 1 Inhaler  "5   • montelukast (SINGULAIR) 10 MG Tab Take 1 Tab by mouth every evening. 30 Tab 11   • Fluticasone Propionate (FLONASE NA) Spray 1 Spray in nose every day. Alternating nostrils to prevent nose bleeds     • cetirizine (ZYRTEC) 10 MG Tab Take 10 mg by mouth.     • fluticasone (FLONASE) 50 MCG/ACT nasal spray INHALE ONE PUFF IN NOSTRILS ONCE A DAY     • Multiple Vitamin (MULTIVITAMIN ADULT PO) Take 1 Tablet by mouth every day.     • Vitamin D, Cholecalciferol, 25 MCG (1000 UT) Cap Take  by mouth.       No current facility-administered medications for this visit.       Patient Active Problem List    Diagnosis Date Noted   • COVID-19 07/07/2022   • Environmental allergies 01/03/2020   • Moderate persistent asthma without complication 09/18/2019   • Family history of diabetes mellitus 06/26/2019   • Essential hypertension 09/23/2015   • MARY (generalized anxiety disorder) 09/23/2015   • H/O Payne's palsy 03/05/2015   • MDD (major depressive disorder) 02/06/2015        Objective:   Pulse 67   Temp 36.7 °C (98.1 °F) (Temporal)   Ht 1.88 m (6' 2\")   Wt 101 kg (222 lb)   SpO2 98%   BMI 28.50 kg/m²     Physical Exam:  Constitutional: Alert, no distress, well-groomed.  Skin: No rashes in visible areas.  Eye: Round. Conjunctiva clear, lids normal. No icterus.   ENMT: Lips pink without lesions, good dentition, moist mucous membranes. Phonation normal.  Neck: No masses, no thyromegaly. Moves freely without pain.  Respiratory: Unlabored respiratory effort, no cough or audible wheeze  Psych: Alert and oriented x3, normal affect and mood.     Assessment and Plan:   The following treatment plan was discussed:     1. Encounter for medical examination to establish care  Health conditions and medications reviewed and updated. All screenings discussed and up-to-date. Health maintenance completed.     - Lipid Profile; Future  - Comp Metabolic Panel; Future  - CBC WITH DIFFERENTIAL; Future    2. Moderate persistent asthma without " complication  3. Environmental allergies  Chronic, controlled.    Continue current management and close follow-up with Dr. Rey.    4. COVID-19  New problem.  Due to patient's asthma diagnosis, I will order antiviral Paxlovid.  Patient states he is unsure if he will start this, however, he would like to have a prescription as we head into the weekend in case his symptoms worsen.  Patient was educated on the side effects and contraindications of this medication.  His current medication list was reviewed and there are no interactions. He is not on a LABA nor any systemic corticosteroids for his asthma.  - Nirmatrelvir & Ritonavir 20 x 150 MG & 10 x 100MG Tablet Therapy Pack; Take 300 mg nirmatrelvir (two 150 mg tablets) with 100 mg ritonavir (one 100 mg tablet) by mouth, with all three tablets taken together twice daily for 5 days.  Dispense: 30 Each; Refill: 0    5. MARY (generalized anxiety disorder)  6. Major depressive disorder in partial remission, unspecified whether recurrent (HCC)  > Continue lifestyle modifications at home to support stress relief and decrease anxiety.  - VITAMIN D,25 HYDROXY; Future    7. Essential hypertension  Chronic, controlled.  Will review home blood pressures and assess need to start medication at his next visit in 4 weeks.    8. Elevated lipoprotein(a)  - Lipid Profile; Future  - Comp Metabolic Panel; Future    Moderate persistent asthma without complication  Patient has chronic asthma, however, it was not until he moved to [place] from [place] that his symptoms worsened, which prevented him from being active outside due to frequent exacerbations.  He is currently followed by Dr Rose of [facility] where he has been receiving monthly allergy shots for the last 2.5 years.   Patient states since receiving the shots, his symptoms have greatly improved.  He states this time of year is normally the worst for him.  However, patient states he is able to go outside and  has started running again.  He does use a Flovent inhaler daily as well as his albuterol inhaler about once a month.  Patient also takes Zyrtec, Singulair and uses Flonase spray.  He feels this current regimen is providing good management.      >Continue close follow-up with Dr. Rey.      Follow-up: Return in about 4 weeks (around 8/4/2022) for Wellness Visit, Lab Review.

## 2022-07-07 NOTE — ASSESSMENT & PLAN NOTE
Patient states he has a long history of whitecoat syndrome and legitimately gets anxious when he goes to the doctor.  He states he periodically will check his blood pressure at home and it is always normal.  Patient states of late, he is worked on [employer] stressing and has started exercising more, which he thinks may help his blood pressure even more.  Today is a virtual visit due to a positive COVID test and so we do not have a blood pressure available to review, however, patient will start using his blood pressure cuff at home to monitor more closely.  We will address this at his next in clinic visit.

## 2022-08-12 ENCOUNTER — HOSPITAL ENCOUNTER (OUTPATIENT)
Dept: LAB | Facility: MEDICAL CENTER | Age: 42
End: 2022-08-12
Payer: COMMERCIAL

## 2022-08-12 DIAGNOSIS — Z00.00 ENCOUNTER FOR MEDICAL EXAMINATION TO ESTABLISH CARE: ICD-10-CM

## 2022-08-12 DIAGNOSIS — F32.4 MAJOR DEPRESSIVE DISORDER IN PARTIAL REMISSION, UNSPECIFIED WHETHER RECURRENT (HCC): ICD-10-CM

## 2022-08-12 DIAGNOSIS — E78.41 ELEVATED LIPOPROTEIN(A): ICD-10-CM

## 2022-08-12 DIAGNOSIS — F41.1 GAD (GENERALIZED ANXIETY DISORDER): ICD-10-CM

## 2022-08-12 LAB
25(OH)D3 SERPL-MCNC: 25 NG/ML (ref 30–100)
ALBUMIN SERPL BCP-MCNC: 4.7 G/DL (ref 3.2–4.9)
ALBUMIN/GLOB SERPL: 2 G/DL
ALP SERPL-CCNC: 58 U/L (ref 30–99)
ALT SERPL-CCNC: 21 U/L (ref 2–50)
ANION GAP SERPL CALC-SCNC: 9 MMOL/L (ref 7–16)
AST SERPL-CCNC: 20 U/L (ref 12–45)
BASOPHILS # BLD AUTO: 0.9 % (ref 0–1.8)
BASOPHILS # BLD: 0.03 K/UL (ref 0–0.12)
BILIRUB SERPL-MCNC: 1 MG/DL (ref 0.1–1.5)
BUN SERPL-MCNC: 13 MG/DL (ref 8–22)
CALCIUM SERPL-MCNC: 9.6 MG/DL (ref 8.5–10.5)
CHLORIDE SERPL-SCNC: 105 MMOL/L (ref 96–112)
CHOLEST SERPL-MCNC: 206 MG/DL (ref 100–199)
CO2 SERPL-SCNC: 25 MMOL/L (ref 20–33)
CREAT SERPL-MCNC: 0.87 MG/DL (ref 0.5–1.4)
EOSINOPHIL # BLD AUTO: 0.22 K/UL (ref 0–0.51)
EOSINOPHIL NFR BLD: 6.3 % (ref 0–6.9)
ERYTHROCYTE [DISTWIDTH] IN BLOOD BY AUTOMATED COUNT: 41.9 FL (ref 35.9–50)
FASTING STATUS PATIENT QL REPORTED: NORMAL
GFR SERPLBLD CREATININE-BSD FMLA CKD-EPI: 111 ML/MIN/1.73 M 2
GLOBULIN SER CALC-MCNC: 2.4 G/DL (ref 1.9–3.5)
GLUCOSE SERPL-MCNC: 90 MG/DL (ref 65–99)
HCT VFR BLD AUTO: 44.2 % (ref 42–52)
HDLC SERPL-MCNC: 46 MG/DL
HGB BLD-MCNC: 15 G/DL (ref 14–18)
IMM GRANULOCYTES # BLD AUTO: 0.01 K/UL (ref 0–0.11)
IMM GRANULOCYTES NFR BLD AUTO: 0.3 % (ref 0–0.9)
LDLC SERPL CALC-MCNC: 138 MG/DL
LYMPHOCYTES # BLD AUTO: 0.99 K/UL (ref 1–4.8)
LYMPHOCYTES NFR BLD: 28.2 % (ref 22–41)
MCH RBC QN AUTO: 30.4 PG (ref 27–33)
MCHC RBC AUTO-ENTMCNC: 33.9 G/DL (ref 33.7–35.3)
MCV RBC AUTO: 89.5 FL (ref 81.4–97.8)
MONOCYTES # BLD AUTO: 0.41 K/UL (ref 0–0.85)
MONOCYTES NFR BLD AUTO: 11.7 % (ref 0–13.4)
NEUTROPHILS # BLD AUTO: 1.85 K/UL (ref 1.82–7.42)
NEUTROPHILS NFR BLD: 52.6 % (ref 44–72)
NRBC # BLD AUTO: 0 K/UL
NRBC BLD-RTO: 0 /100 WBC
PLATELET # BLD AUTO: 173 K/UL (ref 164–446)
PMV BLD AUTO: 11.3 FL (ref 9–12.9)
POTASSIUM SERPL-SCNC: 4 MMOL/L (ref 3.6–5.5)
PROT SERPL-MCNC: 7.1 G/DL (ref 6–8.2)
RBC # BLD AUTO: 4.94 M/UL (ref 4.7–6.1)
SODIUM SERPL-SCNC: 139 MMOL/L (ref 135–145)
TRIGL SERPL-MCNC: 110 MG/DL (ref 0–149)
WBC # BLD AUTO: 3.5 K/UL (ref 4.8–10.8)

## 2022-08-12 PROCEDURE — 80061 LIPID PANEL: CPT

## 2022-08-12 PROCEDURE — 80053 COMPREHEN METABOLIC PANEL: CPT

## 2022-08-12 PROCEDURE — 82306 VITAMIN D 25 HYDROXY: CPT

## 2022-08-12 PROCEDURE — 36415 COLL VENOUS BLD VENIPUNCTURE: CPT

## 2022-08-12 PROCEDURE — 85025 COMPLETE CBC W/AUTO DIFF WBC: CPT

## 2022-08-16 ENCOUNTER — OFFICE VISIT (OUTPATIENT)
Dept: MEDICAL GROUP | Facility: PHYSICIAN GROUP | Age: 42
End: 2022-08-16
Payer: COMMERCIAL

## 2022-08-16 VITALS
WEIGHT: 228.4 LBS | DIASTOLIC BLOOD PRESSURE: 72 MMHG | SYSTOLIC BLOOD PRESSURE: 100 MMHG | HEIGHT: 73 IN | BODY MASS INDEX: 30.27 KG/M2 | TEMPERATURE: 98.4 F | OXYGEN SATURATION: 96 % | HEART RATE: 64 BPM

## 2022-08-16 DIAGNOSIS — E55.9 VITAMIN D DEFICIENCY: ICD-10-CM

## 2022-08-16 DIAGNOSIS — Z91.09 ENVIRONMENTAL ALLERGIES: ICD-10-CM

## 2022-08-16 DIAGNOSIS — R53.83 OTHER FATIGUE: ICD-10-CM

## 2022-08-16 DIAGNOSIS — Z23 NEED FOR VACCINATION: ICD-10-CM

## 2022-08-16 DIAGNOSIS — E78.00 PURE HYPERCHOLESTEROLEMIA: ICD-10-CM

## 2022-08-16 DIAGNOSIS — J45.40 MODERATE PERSISTENT ASTHMA WITHOUT COMPLICATION: ICD-10-CM

## 2022-08-16 PROBLEM — E78.5 HYPERLIPIDEMIA: Status: ACTIVE | Noted: 2022-08-16

## 2022-08-16 PROCEDURE — 99214 OFFICE O/P EST MOD 30 MIN: CPT | Mod: 25

## 2022-08-16 PROCEDURE — 90472 IMMUNIZATION ADMIN EACH ADD: CPT

## 2022-08-16 PROCEDURE — 90677 PCV20 VACCINE IM: CPT

## 2022-08-16 PROCEDURE — 90746 HEPB VACCINE 3 DOSE ADULT IM: CPT

## 2022-08-16 PROCEDURE — 90471 IMMUNIZATION ADMIN: CPT

## 2022-08-16 ASSESSMENT — PATIENT HEALTH QUESTIONNAIRE - PHQ9
9. THOUGHTS THAT YOU WOULD BE BETTER OFF DEAD, OR OF HURTING YOURSELF: NOT AT ALL
5. POOR APPETITE OR OVEREATING: NOT AT ALL
6. FEELING BAD ABOUT YOURSELF - OR THAT YOU ARE A FAILURE OR HAVE LET YOURSELF OR YOUR FAMILY DOWN: NOT AL ALL
2. FEELING DOWN, DEPRESSED, IRRITABLE, OR HOPELESS: NOT AT ALL
SUM OF ALL RESPONSES TO PHQ9 QUESTIONS 1 AND 2: 0
4. FEELING TIRED OR HAVING LITTLE ENERGY: NOT AT ALL
SUM OF ALL RESPONSES TO PHQ QUESTIONS 1-9: 0
8. MOVING OR SPEAKING SO SLOWLY THAT OTHER PEOPLE COULD HAVE NOTICED. OR THE OPPOSITE, BEING SO FIGETY OR RESTLESS THAT YOU HAVE BEEN MOVING AROUND A LOT MORE THAN USUAL: NOT AT ALL
1. LITTLE INTEREST OR PLEASURE IN DOING THINGS: NOT AT ALL
3. TROUBLE FALLING OR STAYING ASLEEP OR SLEEPING TOO MUCH: NOT AT ALL
7. TROUBLE CONCENTRATING ON THINGS, SUCH AS READING THE NEWSPAPER OR WATCHING TELEVISION: NOT AT ALL

## 2022-08-16 ASSESSMENT — FIBROSIS 4 INDEX: FIB4 SCORE: 1.01

## 2022-08-16 NOTE — PROGRESS NOTES
"Subjective:     CC:   Chief Complaint   Patient presents with    Lab Results       HISTORY OF THE PRESENT ILLNESS: Sarabjit is a pleasant 41 y.o. male here today for a lab review as well as to discuss new onset fatigue.    Problem   Other Fatigue    New problem to examiner.  Patient states over the last 3 months he has noticed he is more tired than normal.  He did start running again approximately 3 months ago after taking several years off.  Patient states he used to live in [place], where he ran 6 to 7 miles.  He now will run 2 miles and he states he gets winded early and is having a hard time finding the energy to run like he used to.        Health Maintenance: Completed    ROS:  All systems negative expect as addressed in assessment and plan.     Objective:     Exam:  /72 (BP Location: Left arm, Patient Position: Sitting, BP Cuff Size: Adult)   Pulse 64   Temp 36.9 °C (98.4 °F) (Temporal)   Ht 1.854 m (6' 1\")   Wt 104 kg (228 lb 6.4 oz)   SpO2 96%   BMI 30.13 kg/m²  Body mass index is 30.13 kg/m².    Physical Exam  Constitutional:       Appearance: Normal appearance.   Cardiovascular:      Rate and Rhythm: Normal rate.   Pulmonary:      Effort: Pulmonary effort is normal.   Neurological:      Mental Status: He is alert. Mental status is at baseline.   Psychiatric:         Mood and Affect: Mood normal.         Behavior: Behavior normal.       Labs: Results reviewed from 08/12/22    Assessment & Plan:     41 y.o. male with the following -    1. Other fatigue  New problem.  Patient does a diagnosis of asthma and allergies that have been more severe since he moved to [place] from [place].  He admits to having a more difficult time breathing and he has been more congested since moving here.   Patient also has a history of anxiety and depression.  Patient denies feeling this way today.  He states his depression was stemmed more from stress from his job, however, that has since resolved.  I talked with him " about potential factors in his fatigue including higher elevations than when he ran before.  As well as age, allergies, asthma and mental health.  I will review additional labs to look at thyroid, iron and B12 deficiencies and testosterone.  Will return for lab review.  - TESTOSTERONE, FREE AND TOTAL; Future  - TSH WITH REFLEX TO FT4; Future  - VIT B12,  FOLIC ACID  - FERRITIN; Future    2. Environmental allergies  3. Moderate persistent asthma without complication  Chronic, ongoing.  Followed by Dr. Rose.  Patient states since moving to [facility], he did allergy testing and learned he is allergic to many of the trees in [facility].  He is currently undergoing weekly allergy shots.  He is also managed on Singulair, Flonase and Flovent.  He uses albuterol as needed.  Continue close follow-up with Dr. Rey and current management.    4. Pure hypercholesterolemia  Chronic, ongoing.  The 10-year ASCVD risk score ([place] CONSTANTINE Jr., et al., 2013) is: 0.9%  Medication not indicated.  Counseled on diet and lifestyle.   Latest Reference Range & Units 8/12/22 09:06   Cholesterol,Tot 100 - 199 mg/dL 206 (H)   Triglycerides 0 - 149 mg/dL 110   HDL >=40 mg/dL 46   LDL <100 mg/dL 138 (H)       5. Vitamin D deficiency  New problem.   Patient encouraged to start supplementation of 1,000 units daily.    Latest Reference Range & Units 8/12/22 09:06   25-Hydroxy   Vitamin D 25 30 - 100 ng/mL 25 (L)       6. Need for vaccination  - Pneumococcal Conjugate Vaccine 20-Valent (19 yrs+)  - Hep B Adult 20+    Patient was educated in proper administration of medication(s) ordered today including safety, possible SE, risks, benefits, rationale and alternatives to therapy.   Supportive care, differential diagnoses, and indications for immediate follow-up discussed with patient.    Pathogenesis of diagnosis discussed including typical length and natural progression.    Instructed to return to clinic or nearest emergency department  for any change in condition, further concerns, or worsening of symptoms.  Patient states understanding of the plan of care and discharge instructions.    Return for Lab Review.    I spent a total of 32 minutes with record review, exam, and communication with the patient, communication with other providers, and documentation of this encounter. This does not include time spent on separately billable procedures/tests.    I have placed the above orders and discussed them with an approved delegating provider.  The MA is performing the below orders under the direction of Dr. Gomez.    Please note that this dictation was created using voice recognition software. I have worked with consultants from the vendor as well as technical experts from [facility] to optimize the interface. I have made every reasonable attempt to correct obvious errors, but I expect that there are errors of grammar and possibly content that I did not discover before finalizing the note.

## 2022-08-16 NOTE — LETTER
[place]  KALI Castro  [address]  [place] [address]  Fax: [phone]   Authorization for Release/Disclosure of   Protected Health Information   Name: HANY SELBY : 1980 SSN: [id number]   Address: [address] [place] [address]  [place] [address] Phone:    [phone] (home)    I authorize the entity listed below to release/disclose the PHI below to:   [facility]/KALI Castro and KALI Castro   Provider or Entity Name:  [facility]    Address   [facility], [place] [address] Phone:  [phone]    Fax:  [phone]   Reason for request: continuity of care   Information to be released:    [  ] LAST COLONOSCOPY,  including any PATH REPORT and follow-up  [  ] LAST FIT/COLOGUARD RESULT [  ] LAST DEXA  [  ] LAST MAMMOGRAM  [  ] LAST PAP  [  ] LAST LABS [  ] RETINA EXAM REPORT  [  ] IMMUNIZATION RECORDS  [X] Release all info      [  ] Check here and initial the line next to each item to release ALL health information INCLUDING  _____ Care and treatment for drug and / or alcohol abuse  _____ HIV testing, infection status, or AIDS  _____ Genetic Testing    DATES OF SERVICE OR TIME PERIOD TO BE DISCLOSED: _____________  I understand and acknowledge that:  * This Authorization may be revoked at any time by you in writing, except if your health information has already been used or disclosed.  * Your health information that will be used or disclosed as a result of you signing this authorization could be re-disclosed by the recipient. If this occurs, your re-disclosed health information may no longer be protected by State or Federal laws.  * You may refuse to sign this Authorization. Your refusal will not affect your ability to obtain treatment.  * This Authorization becomes effective upon signing and will  on (date) __________.      If no date is indicated, this Authorization will  one (1) year from  the signature date.    Name: Sarabjit Escobar    Signature:   continuity of care   Date:     8/17/2022       PLEASE FAX REQUESTED RECORDS BACK TO: [phone]

## 2022-08-16 NOTE — ASSESSMENT & PLAN NOTE
New problem.  Patient does a diagnosis of asthma and allergies that have been more severe since he moved to [place] from [place].  He admits to having a more difficult time breathing and he has been more congested since moving here.   Patient also has a history of anxiety and depression.  Patient denies feeling this way today.  He states his depression was stemmed more from stress from his job, however, that has since resolved.  I talked with him about potential factors in his fatigue including higher elevations than when he ran before.  As well as age, allergies, asthma and mental health.  I will review additional labs to look at thyroid, iron and B12 deficiencies and testosterone.  Will return for lab review.

## 2022-08-16 NOTE — ASSESSMENT & PLAN NOTE
New problem.   Patient encouraged to start supplementation of 1,000 units daily.    Latest Reference Range & Units 8/12/22 09:06   25-Hydroxy   Vitamin D 25 30 - 100 ng/mL 25 (L)

## 2022-08-16 NOTE — ASSESSMENT & PLAN NOTE
Chronic, ongoing.  Followed by Dr. Rose.  Patient states since moving to [facility], he did allergy testing and learned he is allergic to many of the trees in [facility].  He is currently undergoing weekly allergy shots.  He is also managed on Singulair, Flonase and Flovent.  He uses albuterol as needed.  Continue close follow-up with Dr. Rey and current management.

## 2022-08-16 NOTE — ASSESSMENT & PLAN NOTE
Chronic, ongoing.  The 10-year ASCVD risk score (Dianayuliet FITCH Jr., et al., 2013) is: 0.9%  Medication not indicated.  Counseled on diet and lifestyle.   Latest Reference Range & Units 8/12/22 09:06   Cholesterol,Tot 100 - 199 mg/dL 206 (H)   Triglycerides 0 - 149 mg/dL 110   HDL >=40 mg/dL 46   LDL <100 mg/dL 138 (H)

## 2022-09-13 ENCOUNTER — HOSPITAL ENCOUNTER (OUTPATIENT)
Dept: LAB | Facility: MEDICAL CENTER | Age: 42
End: 2022-09-13
Payer: COMMERCIAL

## 2022-09-13 DIAGNOSIS — R53.83 OTHER FATIGUE: ICD-10-CM

## 2022-09-13 LAB
FERRITIN SERPL-MCNC: 78.6 NG/ML (ref 22–322)
FOLATE SERPL-MCNC: 10.8 NG/ML
TSH SERPL DL<=0.005 MIU/L-ACNC: 1.7 UIU/ML (ref 0.38–5.33)
VIT B12 SERPL-MCNC: 411 PG/ML (ref 211–911)

## 2022-09-13 PROCEDURE — 84403 ASSAY OF TOTAL TESTOSTERONE: CPT

## 2022-09-13 PROCEDURE — 82607 VITAMIN B-12: CPT

## 2022-09-13 PROCEDURE — 82728 ASSAY OF FERRITIN: CPT

## 2022-09-13 PROCEDURE — 84443 ASSAY THYROID STIM HORMONE: CPT

## 2022-09-13 PROCEDURE — 84270 ASSAY OF SEX HORMONE GLOBUL: CPT

## 2022-09-13 PROCEDURE — 36415 COLL VENOUS BLD VENIPUNCTURE: CPT

## 2022-09-13 PROCEDURE — 82746 ASSAY OF FOLIC ACID SERUM: CPT

## 2022-09-13 PROCEDURE — 84402 ASSAY OF FREE TESTOSTERONE: CPT

## 2022-09-14 LAB
SHBG SERPL-SCNC: 33 NMOL/L (ref 17–56)
TESTOST FREE MFR SERPL: 1.9 % (ref 1.6–2.9)
TESTOST FREE SERPL-MCNC: 94 PG/ML (ref 47–244)
TESTOST SERPL-MCNC: 496 NG/DL (ref 300–890)

## 2022-09-22 ENCOUNTER — OFFICE VISIT (OUTPATIENT)
Dept: MEDICAL GROUP | Facility: PHYSICIAN GROUP | Age: 42
End: 2022-09-22
Payer: COMMERCIAL

## 2022-09-22 VITALS
HEIGHT: 73 IN | SYSTOLIC BLOOD PRESSURE: 104 MMHG | TEMPERATURE: 98.2 F | HEART RATE: 67 BPM | WEIGHT: 230.2 LBS | OXYGEN SATURATION: 97 % | DIASTOLIC BLOOD PRESSURE: 66 MMHG | BODY MASS INDEX: 30.51 KG/M2

## 2022-09-22 DIAGNOSIS — M25.552 BILATERAL HIP PAIN: ICD-10-CM

## 2022-09-22 DIAGNOSIS — R53.83 OTHER FATIGUE: ICD-10-CM

## 2022-09-22 DIAGNOSIS — M25.551 BILATERAL HIP PAIN: ICD-10-CM

## 2022-09-22 PROCEDURE — 99214 OFFICE O/P EST MOD 30 MIN: CPT

## 2022-09-22 ASSESSMENT — FIBROSIS 4 INDEX: FIB4 SCORE: 1.01

## 2022-09-22 NOTE — ASSESSMENT & PLAN NOTE
Chronic, stable.  Patient is working on lifestyle modifications and decreasing stress and is feeling better.  Patient will continue with vitamin D supplementation and better worklife balance.

## 2022-09-22 NOTE — PROGRESS NOTES
"Subjective:     CC:   Chief Complaint   Patient presents with    Lab Results     HISTORY OF THE PRESENT ILLNESS: Sarabjit is a pleasant 41 y.o. male here today to     Problem   Bilateral Hip Pain    Patient recently started running since moving to [place].   He has pain and tightness in his hip flexors that is interfering with his progress.  Patient states he has done exercises as well as yoga which have helped, however, the relief is always temporary. Patient states he did physical therapy in the past for low back pain and had good results.  He is inquiring about any exercises and physical therapy that may help.      Other Fatigue    We did lab work to look for any vitamin deficiencies or abnormalities that could indicate why he might be feeling fatigued.  All labs are normal.  Testosterone is normal.  After reviewing discussion today, patient states he has been making some lifestyle changes that have improved the way he feels.  Patient states he feels he could increase his water intake.  We did discuss a vitamin D deficiency at our last appointment and he did start taking a vitamin D supplement and states he is starting to feel better with that.  He also started doing yoga and stretching and feels like it could be helping.   He gets 7 hours of sleep and has a consistent schedule. He sleeps good. He also got a new job that is a lot less stressful.  He is eating healthier. Less red meat. More fish and vegetables and cooking at home. He's not drinking as much.  He also is running again. But he is tight and has some pain in his hip flexors.        Health Maintenance: Completed    ROS:  All systems negative expect as addressed in assessment and plan.     Objective:     Exam:  /66 (BP Location: Left arm, Patient Position: Sitting, BP Cuff Size: Adult)   Pulse 67   Temp 36.8 °C (98.2 °F) (Temporal)   Ht 1.854 m (6' 1\")   Wt 104 kg (230 lb 3.2 oz)   SpO2 97%   BMI 30.37 kg/m²  Body mass index is 30.37 " kg/m².    Physical Exam  Constitutional:       Appearance: Normal appearance.   HENT:      Right Ear: Tympanic membrane normal.      Left Ear: Tympanic membrane normal.   Cardiovascular:      Rate and Rhythm: Normal rate.   Pulmonary:      Effort: Pulmonary effort is normal.      Breath sounds: Normal breath sounds.   Abdominal:      General: Bowel sounds are normal.      Palpations: Abdomen is soft.   Musculoskeletal:         General: Normal range of motion.   Neurological:      Mental Status: He is alert. Mental status is at baseline.   Psychiatric:         Mood and Affect: Mood normal.         Behavior: Behavior normal.       Labs: Results reviewed from 08/12/22    Assessment & Plan:     41 y.o. male with the following -    1. Other fatigue  Chronic, stable.  Patient is working on lifestyle modifications and decreasing stress and is feeling better.  Patient will continue with vitamin D supplementation and better worklife balance.  - Referral to Physical Therapy    2. Bilateral hip pain  Chronic, ongoing.  Will place referral to physical therapy for support with additional exercises to help with hip flexor strength and flexibility.  - Referral to Physical Therapy    Patient was educated in proper administration of medication(s) ordered today including safety, possible SE, risks, benefits, rationale and alternatives to therapy.   Supportive care, differential diagnoses, and indications for immediate follow-up discussed with patient.    Pathogenesis of diagnosis discussed including typical length and natural progression.    Instructed to return to clinic or nearest emergency department for any change in condition, further concerns, or worsening of symptoms.  Patient states understanding of the plan of care and discharge instructions.    Return in about 1 year (around 9/22/2023) for Wellness Visit.    I spent a total of 30 minutes with record review, exam, and communication with the patient, communication with other  providers, and documentation of this encounter. This does not include time spent on separately billable procedures/tests.    I have placed the above orders and discussed them with an approved delegating provider.  The MA is performing the below orders under the direction of Dr. Gomez.    Please note that this dictation was created using voice recognition software. I have worked with consultants from the vendor as well as technical experts from [facility] to optimize the interface. I have made every reasonable attempt to correct obvious errors, but I expect that there are errors of grammar and possibly content that I did not discover before finalizing the note.  '

## 2022-09-22 NOTE — ASSESSMENT & PLAN NOTE
Chronic, ongoing.  Will place referral to physical therapy for support with additional exercises to help with hip flexor strength and flexibility.

## 2022-10-11 ENCOUNTER — NON-PROVIDER VISIT (OUTPATIENT)
Dept: MEDICAL GROUP | Facility: PHYSICIAN GROUP | Age: 42
End: 2022-10-11
Payer: COMMERCIAL

## 2022-10-11 DIAGNOSIS — Z23 NEED FOR VACCINATION: ICD-10-CM

## 2022-10-11 PROCEDURE — 90471 IMMUNIZATION ADMIN: CPT | Performed by: INTERNAL MEDICINE

## 2022-10-11 PROCEDURE — 90746 HEPB VACCINE 3 DOSE ADULT IM: CPT | Performed by: INTERNAL MEDICINE

## 2022-10-11 NOTE — PROGRESS NOTES
"Alexy Escobar is a 41 y.o. male here for a non-provider visit for:   HEPATITIS B 2 of 3    Reason for immunization: Overdue/Provider Recommended  Immunization records indicate need for vaccine: Yes, confirmed with Epic  Minimum interval has been met for this vaccine: Yes  ABN completed: Yes    VIS Dated  10/11/22 was given to patient: Yes  All IAC Questionnaire questions were answered \"No.\"    Patient tolerated injection and no adverse effects were observed or reported: Yes    Pt scheduled for next dose in series: Yes   "

## 2023-03-19 ENCOUNTER — OFFICE VISIT (OUTPATIENT)
Dept: URGENT CARE | Facility: CLINIC | Age: 43
End: 2023-03-19
Payer: COMMERCIAL

## 2023-03-19 VITALS
BODY MASS INDEX: 28.88 KG/M2 | DIASTOLIC BLOOD PRESSURE: 82 MMHG | OXYGEN SATURATION: 99 % | HEART RATE: 97 BPM | RESPIRATION RATE: 16 BRPM | WEIGHT: 225 LBS | SYSTOLIC BLOOD PRESSURE: 130 MMHG | HEIGHT: 74 IN | TEMPERATURE: 100.3 F

## 2023-03-19 DIAGNOSIS — J45.901 ASTHMA WITH ACUTE EXACERBATION, UNSPECIFIED ASTHMA SEVERITY, UNSPECIFIED WHETHER PERSISTENT: ICD-10-CM

## 2023-03-19 DIAGNOSIS — J22 LRTI (LOWER RESPIRATORY TRACT INFECTION): ICD-10-CM

## 2023-03-19 DIAGNOSIS — R05.1 ACUTE COUGH: ICD-10-CM

## 2023-03-19 PROCEDURE — 99214 OFFICE O/P EST MOD 30 MIN: CPT | Performed by: NURSE PRACTITIONER

## 2023-03-19 RX ORDER — DOXYCYCLINE HYCLATE 100 MG
100 TABLET ORAL EVERY 12 HOURS
Qty: 14 TABLET | Refills: 0 | Status: SHIPPED | OUTPATIENT
Start: 2023-03-19 | End: 2023-03-26

## 2023-03-19 RX ORDER — EPINEPHRINE 0.3 MG/.3ML
INJECTION SUBCUTANEOUS
COMMUNITY
Start: 2023-03-01

## 2023-03-19 RX ORDER — PREDNISONE 20 MG/1
20 TABLET ORAL DAILY
Qty: 5 TABLET | Refills: 0 | Status: SHIPPED | OUTPATIENT
Start: 2023-03-19 | End: 2023-03-24

## 2023-03-19 ASSESSMENT — ENCOUNTER SYMPTOMS
SPUTUM PRODUCTION: 1
FATIGUE: 1
SHORTNESS OF BREATH: 1
FEVER: 1
COUGH: 1

## 2023-03-19 ASSESSMENT — FIBROSIS 4 INDEX: FIB4 SCORE: 1.03

## 2023-03-19 ASSESSMENT — VISUAL ACUITY: OU: 1

## 2023-03-19 NOTE — PROGRESS NOTES
Subjective:     Sarabjit Escobar is a 42 y.o. male who presents for Fever, Cough, Chest Pressure, and Fatigue       Fever   This is a new problem. The problem has been gradually worsening. Associated symptoms include coughing.   Cough  Associated symptoms include a fever and shortness of breath. His past medical history is significant for environmental allergies.   Fatigue  Associated symptoms include coughing, fatigue and a fever.     3 weeks ago, started to notice increased allergy symptoms.  History of environmental allergies.  Has been taking Sudafed which helps.  Got  3 weeks ago.  Has been wearing a mask ever since.    For about the past 2 days, has started to notice increased sensation of chest congestion, mild fever, fatigue, and cough when taking in deep breaths.  Coughing up green phlegm.  History of asthma.  History of lower respiratory infection.  Did home COVID test yesterday which was negative.  Comes in for evaluation today.  Been using his Proventil.    Review of Systems   Constitutional:  Positive for fatigue, fever and malaise/fatigue.   Respiratory:  Positive for cough, sputum production and shortness of breath.    Endo/Heme/Allergies:  Positive for environmental allergies.   All other systems reviewed and are negative.    Refer to HPI for additional details.    During this visit, appropriate PPE was worn, hand hygiene was performed, and the patient and any visitors were masked.    PMH:  has a past medical history of Asthma (5/19/2014), History of iron deficiency (6/26/2019), Shortness of breath, and Wheezing.    MEDS:   Current Outpatient Medications:     doxycycline (VIBRAMYCIN) 100 MG Tab, Take 1 Tablet by mouth every 12 hours for 7 days., Disp: 14 Tablet, Rfl: 0    predniSONE (DELTASONE) 20 MG Tab, Take 1 Tablet by mouth every day for 5 days., Disp: 5 Tablet, Rfl: 0    fluticasone (FLONASE) 50 MCG/ACT nasal spray, INHALE ONE PUFF IN NOSTRILS ONCE A DAY, Disp: , Rfl:      "Multiple Vitamin (MULTIVITAMIN ADULT PO), Take 1 Tablet by mouth every day., Disp: , Rfl:     Vitamin D, Cholecalciferol, 25 MCG (1000 UT) Cap, Take  by mouth., Disp: , Rfl:     FLOVENT  MCG/ACT Aerosol, , Disp: , Rfl:     albuterol 108 (90 Base) MCG/ACT Aero Soln inhalation aerosol, Inhale 2 Puffs by mouth every four hours as needed., Disp: 1 Inhaler, Rfl: 5    montelukast (SINGULAIR) 10 MG Tab, Take 1 Tab by mouth every evening., Disp: 30 Tab, Rfl: 11    ALLERGIES: No Known Allergies  SURGHX: History reviewed. No pertinent surgical history.  SOCHX:  reports that he has never smoked. He has never used smokeless tobacco. He reports current alcohol use of about 1.8 - 2.4 oz per week. He reports that he does not use drugs.    FH: Per HPI as applicable/pertinent.      Objective:     /82 (BP Location: Left arm, Patient Position: Sitting, BP Cuff Size: Adult)   Pulse 97   Temp 37.9 °C (100.3 °F) (Temporal)   Resp 16   Ht 1.88 m (6' 2\")   Wt 102 kg (225 lb)   SpO2 99%   BMI 28.89 kg/m²     Physical Exam  Nursing note reviewed.   Constitutional:       General: He is not in acute distress.     Appearance: He is well-developed. He is not ill-appearing or toxic-appearing.   Eyes:      General: Vision grossly intact.      Extraocular Movements: Extraocular movements intact.   Neck:      Trachea: Phonation normal.   Cardiovascular:      Rate and Rhythm: Normal rate and regular rhythm.      Pulses: Normal pulses.      Heart sounds: Normal heart sounds.   Pulmonary:      Effort: Pulmonary effort is normal. No respiratory distress.      Breath sounds: Rhonchi present. No decreased breath sounds.   Musculoskeletal:         General: No deformity. Normal range of motion.      Cervical back: Normal range of motion.   Skin:     General: Skin is warm and dry.      Coloration: Skin is not pale.   Neurological:      Mental Status: He is alert and oriented to person, place, and time.      Motor: No weakness. "   Psychiatric:         Behavior: Behavior normal. Behavior is cooperative.       Assessment/Plan:     1. Acute cough    2. Asthma with acute exacerbation, unspecified asthma severity, unspecified whether persistent  - predniSONE (DELTASONE) 20 MG Tab; Take 1 Tablet by mouth every day for 5 days.  Dispense: 5 Tablet; Refill: 0    3. LRTI (lower respiratory tract infection)  - doxycycline (VIBRAMYCIN) 100 MG Tab; Take 1 Tablet by mouth every 12 hours for 7 days.  Dispense: 14 Tablet; Refill: 0    Rx as above sent electronically.  Continue Proventil as needed.  May use Mucinex DM.    Differential diagnosis, natural history, supportive care, rest, fluids, over-the-counter symptom management per 's instructions, close monitoring, and indications for immediate follow-up discussed.     Warning signs reviewed. Return precautions discussed.     All questions answered. Patient agrees with the plan of care.    Discharge summary provided via [employer].    Billing note: chronic illness with exacerbation/progression; prescription drug management. Established patient. [id number]. Please refer to LOS tool for details.

## 2023-04-16 DIAGNOSIS — M79.10 MUSCLE ACHE: ICD-10-CM

## 2023-04-21 ENCOUNTER — NON-PROVIDER VISIT (OUTPATIENT)
Dept: MEDICAL GROUP | Facility: PHYSICIAN GROUP | Age: 43
End: 2023-04-21
Payer: COMMERCIAL

## 2023-04-21 DIAGNOSIS — Z23 NEED FOR VACCINATION: ICD-10-CM

## 2023-04-21 PROCEDURE — 90471 IMMUNIZATION ADMIN: CPT

## 2023-04-21 PROCEDURE — 90746 HEPB VACCINE 3 DOSE ADULT IM: CPT

## 2023-04-21 NOTE — PROGRESS NOTES
"Alexy Escobar is a 42 y.o. male here for a non-provider visit for:   HEPATITIS B 3 of 3    Reason for immunization: Overdue/Provider Recommended  Immunization records indicate need for vaccine: Yes, confirmed with Epic  Minimum interval has been met for this vaccine: Yes  ABN completed: Yes    VIS Dated  4/21/23 was given to patient: Yes  All IAC Questionnaire questions were answered \"No.\"    Patient tolerated injection and no adverse effects were observed or reported: Yes    Pt scheduled for next dose in series: No  "

## 2023-06-04 ENCOUNTER — OFFICE VISIT (OUTPATIENT)
Dept: URGENT CARE | Facility: CLINIC | Age: 43
End: 2023-06-04
Payer: COMMERCIAL

## 2023-06-04 VITALS
HEIGHT: 74 IN | WEIGHT: 227 LBS | OXYGEN SATURATION: 98 % | SYSTOLIC BLOOD PRESSURE: 110 MMHG | RESPIRATION RATE: 16 BRPM | BODY MASS INDEX: 29.13 KG/M2 | TEMPERATURE: 97.9 F | HEART RATE: 111 BPM | DIASTOLIC BLOOD PRESSURE: 82 MMHG

## 2023-06-04 DIAGNOSIS — S61.210A LACERATION OF RIGHT INDEX FINGER WITHOUT FOREIGN BODY WITHOUT DAMAGE TO NAIL, INITIAL ENCOUNTER: ICD-10-CM

## 2023-06-04 PROCEDURE — 3079F DIAST BP 80-89 MM HG: CPT

## 2023-06-04 PROCEDURE — 99213 OFFICE O/P EST LOW 20 MIN: CPT

## 2023-06-04 PROCEDURE — 3074F SYST BP LT 130 MM HG: CPT

## 2023-06-04 ASSESSMENT — FIBROSIS 4 INDEX: FIB4 SCORE: 1.03

## 2023-06-04 NOTE — PROGRESS NOTES
Subjective:   Sarabjit Escobar is a 42 y.o. male who presents for Laceration ((R) index finger cut finger while cleaning sheers X today)      HPI:    Patient presents urgent care with concerns of right index finger laceration  Injury occurred 30 minutes prior to arrival to urgent care  States he was cleaning his garden [place]  States the wound has bled profusely  Denies decreased range of motion, altered sensation, weakness of the right hand or finger  States last Tdap was in the last 5 years      ROS As above in HPI    Medications:    Current Outpatient Medications on File Prior to Visit   Medication Sig Dispense Refill    EPINEPHrine (EPIPEN) 0.3 MG/0.3ML Solution Auto-injector solution for injection USE IN CASE OF ANAPHYLAXIS REACTION      fluticasone (FLONASE) 50 MCG/ACT nasal spray INHALE ONE PUFF IN NOSTRILS ONCE A DAY      Multiple Vitamin (MULTIVITAMIN ADULT PO) Take 1 Tablet by mouth every day.      Vitamin D, Cholecalciferol, 25 MCG (1000 UT) Cap Take  by mouth.      FLOVENT  MCG/ACT Aerosol       albuterol 108 (90 Base) MCG/ACT Aero Soln inhalation aerosol Inhale 2 Puffs by mouth every four hours as needed. 1 Inhaler 5    montelukast (SINGULAIR) 10 MG Tab Take 1 Tab by mouth every evening. 30 Tab 11     No current facility-administered medications on file prior to visit.        Allergies:   Patient has no known allergies.    Problem List:   Patient Active Problem List   Diagnosis    Family history of diabetes mellitus    Moderate persistent asthma without complication    Environmental allergies    Essential hypertension    MARY (generalized anxiety disorder)    H/O Bell's palsy    MDD (major depressive disorder)    COVID-19    Vitamin D deficiency    Hyperlipidemia    Other fatigue    Bilateral hip pain        Surgical History:  No past surgical history on file.    Past Social Hx:   Social History     Tobacco Use    Smoking status: Never    Smokeless tobacco: Never   Vaping Use    Vaping  "Use: Never used   Substance Use Topics    Alcohol use: Yes     Alcohol/week: 1.8 - 2.4 oz     Types: 3 - 4 Cans of beer per week     Comment: OCC    Drug use: No          Problem list, medications, and allergies reviewed by myself today in Epic.     Objective:     /82   Pulse (!) 111   Temp 36.6 °C (97.9 °F) (Temporal)   Resp 16   Ht 1.88 m (6' 2\")   Wt 103 kg (227 lb) Comment: per patient  SpO2 98%   BMI 29.15 kg/m²     Physical Exam  Vitals and nursing note reviewed.   Constitutional:       General: He is not in acute distress.     Appearance: Normal appearance. He is not ill-appearing or diaphoretic.   HENT:      Head: Normocephalic and atraumatic.   Cardiovascular:      Rate and Rhythm: Normal rate and regular rhythm.      Heart sounds: Normal heart sounds.   Pulmonary:      Effort: Pulmonary effort is normal.      Breath sounds: Normal breath sounds.   Skin:     General: Skin is warm and dry.      Capillary Refill: Capillary refill takes less than 2 seconds.      Findings: Laceration present. No rash.      Comments: Volar right 1st distal digit has superficial horizontal laceration. Approximately 2.5 cm in length.  Wound is actively bleeding.   strength is 5 out of 5, full and active range of motion of all joints in the right hand, sensation is intact to light and sharp touch, cap refill less than 2 seconds, 2+ radial pulse. No FB.   Neurological:      Mental Status: He is alert and oriented to person, place, and time.         Assessment/Plan:     Diagnosis and associated orders:   1. Laceration of right index finger without foreign body without damage to nail, initial encounter  - Laceration Repair        Comments/MDM:     Procedure: Laceration Repair  -Risks including bleeding, nerve damage, infection, and poor cosmetic outcome discussed. Benefits and alternatives discussed.   -Wound irrigated with NS copiously. No FB appreciated.  -Wound cleansed with Hibiclens.  -Clean technique with " sterile instruments used  -Wound margins well approximated, hemostasis achieved with medical grade glue and steri strips  -Dressing placed  -Patient tolerated well    The patient is alerted to watch for any signs of infection (redness, pus, pain, increased swelling or fever) and return to  if such occurs. Home wound care instructions are provided. Tetanus vaccination status reviewed: last tetanus booster 4 years ago.    Follow up with PCP as needed.           Please note that this dictation was created using voice recognition software. I have made a reasonable attempt to correct obvious errors, but I expect that there are errors of grammar and possibly content that I did not discover before finalizing the note.    This note was electronically signed by Nidhi Méndez, DNP

## 2023-06-13 ENCOUNTER — HOSPITAL ENCOUNTER (OUTPATIENT)
Dept: LAB | Facility: MEDICAL CENTER | Age: 43
End: 2023-06-13
Payer: COMMERCIAL

## 2023-06-13 DIAGNOSIS — M79.10 MUSCLE ACHE: ICD-10-CM

## 2023-06-13 LAB
25(OH)D3 SERPL-MCNC: 28 NG/ML (ref 30–100)
FERRITIN SERPL-MCNC: 92.4 NG/ML (ref 22–322)
FOLATE SERPL-MCNC: 15.9 NG/ML
IRON SATN MFR SERPL: 28 % (ref 15–55)
IRON SERPL-MCNC: 75 UG/DL (ref 50–180)
MAGNESIUM SERPL-MCNC: 1.8 MG/DL (ref 1.5–2.5)
TIBC SERPL-MCNC: 266 UG/DL (ref 250–450)
UIBC SERPL-MCNC: 191 UG/DL (ref 110–370)
VIT B12 SERPL-MCNC: 593 PG/ML (ref 211–911)

## 2023-06-13 PROCEDURE — 82306 VITAMIN D 25 HYDROXY: CPT

## 2023-06-13 PROCEDURE — 82607 VITAMIN B-12: CPT

## 2023-06-13 PROCEDURE — 82746 ASSAY OF FOLIC ACID SERUM: CPT

## 2023-06-13 PROCEDURE — 36415 COLL VENOUS BLD VENIPUNCTURE: CPT

## 2023-06-13 PROCEDURE — 82728 ASSAY OF FERRITIN: CPT

## 2023-06-13 PROCEDURE — 83540 ASSAY OF IRON: CPT

## 2023-06-13 PROCEDURE — 83550 IRON BINDING TEST: CPT

## 2023-06-13 PROCEDURE — 83735 ASSAY OF MAGNESIUM: CPT

## 2023-06-15 ENCOUNTER — HOSPITAL ENCOUNTER (OUTPATIENT)
Dept: LAB | Facility: MEDICAL CENTER | Age: 43
End: 2023-06-15
Payer: COMMERCIAL

## 2023-06-15 DIAGNOSIS — M79.10 MUSCLE ACHE: ICD-10-CM

## 2023-06-15 LAB
ANION GAP SERPL CALC-SCNC: 10 MMOL/L (ref 7–16)
BUN SERPL-MCNC: 12 MG/DL (ref 8–22)
CALCIUM SERPL-MCNC: 9.7 MG/DL (ref 8.5–10.5)
CHLORIDE SERPL-SCNC: 106 MMOL/L (ref 96–112)
CO2 SERPL-SCNC: 25 MMOL/L (ref 20–33)
CREAT SERPL-MCNC: 0.94 MG/DL (ref 0.5–1.4)
GFR SERPLBLD CREATININE-BSD FMLA CKD-EPI: 104 ML/MIN/1.73 M 2
GLUCOSE SERPL-MCNC: 92 MG/DL (ref 65–99)
POTASSIUM SERPL-SCNC: 4.7 MMOL/L (ref 3.6–5.5)
SODIUM SERPL-SCNC: 141 MMOL/L (ref 135–145)

## 2023-06-15 PROCEDURE — 36415 COLL VENOUS BLD VENIPUNCTURE: CPT

## 2023-06-15 PROCEDURE — 80048 BASIC METABOLIC PNL TOTAL CA: CPT

## 2023-06-22 ENCOUNTER — OFFICE VISIT (OUTPATIENT)
Dept: MEDICAL GROUP | Facility: MEDICAL CENTER | Age: 43
End: 2023-06-22
Payer: COMMERCIAL

## 2023-06-22 VITALS
DIASTOLIC BLOOD PRESSURE: 80 MMHG | HEART RATE: 67 BPM | BODY MASS INDEX: 30.03 KG/M2 | TEMPERATURE: 98 F | HEIGHT: 74 IN | WEIGHT: 234 LBS | OXYGEN SATURATION: 97 % | SYSTOLIC BLOOD PRESSURE: 112 MMHG

## 2023-06-22 DIAGNOSIS — M54.6 CHRONIC MIDLINE THORACIC BACK PAIN: ICD-10-CM

## 2023-06-22 DIAGNOSIS — J45.40 MODERATE PERSISTENT ASTHMA WITHOUT COMPLICATION: ICD-10-CM

## 2023-06-22 DIAGNOSIS — G89.29 CHRONIC MIDLINE THORACIC BACK PAIN: ICD-10-CM

## 2023-06-22 DIAGNOSIS — R06.83 SNORING: ICD-10-CM

## 2023-06-22 DIAGNOSIS — Z11.59 NEED FOR HEPATITIS C SCREENING TEST: ICD-10-CM

## 2023-06-22 DIAGNOSIS — E78.00 PURE HYPERCHOLESTEROLEMIA: ICD-10-CM

## 2023-06-22 PROCEDURE — 99214 OFFICE O/P EST MOD 30 MIN: CPT | Performed by: STUDENT IN AN ORGANIZED HEALTH CARE EDUCATION/TRAINING PROGRAM

## 2023-06-22 PROCEDURE — 3079F DIAST BP 80-89 MM HG: CPT | Performed by: STUDENT IN AN ORGANIZED HEALTH CARE EDUCATION/TRAINING PROGRAM

## 2023-06-22 PROCEDURE — 3074F SYST BP LT 130 MM HG: CPT | Performed by: STUDENT IN AN ORGANIZED HEALTH CARE EDUCATION/TRAINING PROGRAM

## 2023-06-22 ASSESSMENT — ENCOUNTER SYMPTOMS
SHORTNESS OF BREATH: 0
NAUSEA: 0
CHILLS: 0
FOCAL WEAKNESS: 0
FEVER: 0
BACK PAIN: 1
COUGH: 0
VOMITING: 0
ABDOMINAL PAIN: 0

## 2023-06-22 ASSESSMENT — FIBROSIS 4 INDEX: FIB4 SCORE: 1.03

## 2023-06-22 ASSESSMENT — PATIENT HEALTH QUESTIONNAIRE - PHQ9: CLINICAL INTERPRETATION OF PHQ2 SCORE: 0

## 2023-06-22 NOTE — PROGRESS NOTES
"Subjective:     CC: establish care    HPI:   Sarabjit presents today with    Problem   Snoring    Stop Bang score 3 points. Intermediate risk for moderate to severe NOELLE.  Had sleep study 5 years ago, was negative at that time.     Chronic Midline Thoracic Back Pain    Chronic. Between shoulder blades     Hyperlipidemia     Latest Reference Range & Units 07/11/19 08:39 08/12/22 09:06   Cholesterol,Tot 100 - 199 mg/dL 191 206 (H)   Triglycerides 0 - 149 mg/dL 122 110   HDL >=40 mg/dL 48 46   LDL <100 mg/dL 119 (H) 138 (H)   (H): Data is abnormally high     Moderate Persistent Asthma Without Complication    Chronic.  5 years ago moved to [place] from [place], since than had more allergies.   Uses resque albuterol 1x per month         Health Maintenance: Completed    ROS:  Review of Systems   Constitutional:  Negative for chills and fever.   Respiratory:  Negative for cough and shortness of breath.    Cardiovascular:  Negative for chest pain and leg swelling.   Gastrointestinal:  Negative for abdominal pain, nausea and vomiting.   Musculoskeletal:  Positive for back pain.   Neurological:  Negative for focal weakness.       Objective:     Exam:  /80 (BP Location: Left arm, Patient Position: Sitting, BP Cuff Size: Adult long)   Pulse 67   Temp 36.7 °C (98 °F) (Temporal)   Ht 1.88 m (6' 2\")   Wt 106 kg (234 lb)   SpO2 97%   BMI 30.04 kg/m²  Body mass index is 30.04 kg/m².    Physical Exam  Vitals reviewed.   HENT:      Head: Normocephalic.      Mouth/Throat:      Mouth: Mucous membranes are moist.      Pharynx: Oropharynx is clear.   Eyes:      Pupils: Pupils are equal, round, and reactive to light.   Cardiovascular:      Rate and Rhythm: Normal rate and regular rhythm.   Pulmonary:      Effort: Pulmonary effort is normal. No respiratory distress.      Breath sounds: No wheezing or rales.   Abdominal:      General: Abdomen is flat. Bowel sounds are normal. There is no distension.      Tenderness: There is no " abdominal tenderness. There is no guarding.   Skin:     General: Skin is warm.   Neurological:      General: No focal deficit present.      Mental Status: He is alert and oriented to person, place, and time.     Labs: reviewed, ordered    Assessment & Plan:     42 y.o. male with the following -     1. Moderate persistent asthma without complication  Chronic, stable.  - albuterol prn  - montelukast 10 mg    2. Pure hypercholesterolemia  Chronic, we will repeat labs and follow up  - Lipid Profile; Future    3. Need for hepatitis C screening test  - HEP C VIRUS ANTIBODY; Future    4. Snoring  - Referral to Pulmonary and Sleep Medicine    5. Chronic midline thoracic back pain  - Referral to Physical Therapy    Return in about 1 month (around 7/22/2023) for F/u labs.    Please note that this dictation was created using voice recognition software. I have made every reasonable attempt to correct obvious errors, but I expect that there are errors of grammar and possibly content that I did not discover before finalizing the note.

## 2023-06-28 ENCOUNTER — TELEPHONE (OUTPATIENT)
Dept: HEALTH INFORMATION MANAGEMENT | Facility: OTHER | Age: 43
End: 2023-06-28
Payer: COMMERCIAL

## 2023-08-04 ENCOUNTER — HOSPITAL ENCOUNTER (OUTPATIENT)
Dept: LAB | Facility: MEDICAL CENTER | Age: 43
End: 2023-08-04
Attending: STUDENT IN AN ORGANIZED HEALTH CARE EDUCATION/TRAINING PROGRAM
Payer: COMMERCIAL

## 2023-08-04 DIAGNOSIS — Z11.59 NEED FOR HEPATITIS C SCREENING TEST: ICD-10-CM

## 2023-08-04 DIAGNOSIS — E78.00 PURE HYPERCHOLESTEROLEMIA: ICD-10-CM

## 2023-08-04 LAB
CHOLEST SERPL-MCNC: 216 MG/DL (ref 100–199)
FASTING STATUS PATIENT QL REPORTED: NORMAL
HCV AB SER QL: NORMAL
HDLC SERPL-MCNC: 47 MG/DL
LDLC SERPL CALC-MCNC: 143 MG/DL
TRIGL SERPL-MCNC: 129 MG/DL (ref 0–149)

## 2023-08-04 PROCEDURE — 80061 LIPID PANEL: CPT

## 2023-08-04 PROCEDURE — 36415 COLL VENOUS BLD VENIPUNCTURE: CPT

## 2023-08-04 PROCEDURE — 86803 HEPATITIS C AB TEST: CPT

## 2023-08-07 ENCOUNTER — APPOINTMENT (OUTPATIENT)
Dept: MEDICAL GROUP | Facility: MEDICAL CENTER | Age: 43
End: 2023-08-07
Payer: COMMERCIAL

## 2023-08-10 ENCOUNTER — OFFICE VISIT (OUTPATIENT)
Dept: MEDICAL GROUP | Facility: MEDICAL CENTER | Age: 43
End: 2023-08-10
Payer: COMMERCIAL

## 2023-08-10 VITALS
HEART RATE: 85 BPM | OXYGEN SATURATION: 96 % | TEMPERATURE: 98.2 F | RESPIRATION RATE: 14 BRPM | DIASTOLIC BLOOD PRESSURE: 78 MMHG | SYSTOLIC BLOOD PRESSURE: 122 MMHG | BODY MASS INDEX: 29.52 KG/M2 | WEIGHT: 230 LBS | HEIGHT: 74 IN

## 2023-08-10 DIAGNOSIS — G89.29 CHRONIC MIDLINE THORACIC BACK PAIN: ICD-10-CM

## 2023-08-10 DIAGNOSIS — M54.6 CHRONIC MIDLINE THORACIC BACK PAIN: ICD-10-CM

## 2023-08-10 DIAGNOSIS — R06.83 SNORING: ICD-10-CM

## 2023-08-10 DIAGNOSIS — J45.40 MODERATE PERSISTENT ASTHMA WITHOUT COMPLICATION: ICD-10-CM

## 2023-08-10 DIAGNOSIS — E78.00 PURE HYPERCHOLESTEROLEMIA: ICD-10-CM

## 2023-08-10 DIAGNOSIS — J45.41 MODERATE PERSISTENT ASTHMA WITH ACUTE EXACERBATION: ICD-10-CM

## 2023-08-10 DIAGNOSIS — M25.572 ACUTE LEFT ANKLE PAIN: ICD-10-CM

## 2023-08-10 DIAGNOSIS — E55.9 VITAMIN D DEFICIENCY: ICD-10-CM

## 2023-08-10 PROCEDURE — 99213 OFFICE O/P EST LOW 20 MIN: CPT | Performed by: STUDENT IN AN ORGANIZED HEALTH CARE EDUCATION/TRAINING PROGRAM

## 2023-08-10 PROCEDURE — 3074F SYST BP LT 130 MM HG: CPT | Performed by: STUDENT IN AN ORGANIZED HEALTH CARE EDUCATION/TRAINING PROGRAM

## 2023-08-10 PROCEDURE — 3078F DIAST BP <80 MM HG: CPT | Performed by: STUDENT IN AN ORGANIZED HEALTH CARE EDUCATION/TRAINING PROGRAM

## 2023-08-10 ASSESSMENT — ENCOUNTER SYMPTOMS
SHORTNESS OF BREATH: 0
VOMITING: 0
CHILLS: 0
NAUSEA: 0
COUGH: 0
FOCAL WEAKNESS: 0
ABDOMINAL PAIN: 0
BACK PAIN: 1
WHEEZING: 0
FEVER: 0

## 2023-08-10 ASSESSMENT — FIBROSIS 4 INDEX: FIB4 SCORE: 1.03

## 2023-08-10 NOTE — PROGRESS NOTES
"Subjective:     CC: follow up    HPI:   Sarabjit presents today with    Problem   Acute Left Ankle Pain    Patient twisted left ankle during yard work 5 days ago. Has pain, bruising at left ankle. Swelling get better     Chronic Midline Thoracic Back Pain    Chronic. Between shoulder blades. Getting PT, feeing much better.     Hyperlipidemia     Latest Reference Range & Units 08/12/22 09:06 08/04/23 08:36   Cholesterol,Tot 100 - 199 mg/dL 206 (H) 216 (H)   Triglycerides 0 - 149 mg/dL 110 129   HDL >=40 mg/dL 46 47   LDL <100 mg/dL 138 (H) 143 (H)   (H): Data is abnormally high  The 10-year ASCVD risk score (Charito DONNELLY, et al., 2019) is: 1.5%     Moderate Persistent Asthma Without Complication    Chronic.  5 years ago moved to [place] from [place], since than had more allergies.   Uses resque albuterol 1x per month  Follows Asthma and Allergy Dr. Yap         Health Maintenance: Completed    ROS:  Review of Systems   Constitutional:  Negative for chills and fever.   Respiratory:  Negative for cough, shortness of breath and wheezing.    Cardiovascular:  Negative for chest pain and leg swelling.   Gastrointestinal:  Negative for abdominal pain, nausea and vomiting.   Musculoskeletal:  Positive for back pain (thoracic) and joint pain.   Neurological:  Negative for focal weakness.       Objective:     Exam:  /78 (BP Location: Right arm, Patient Position: Sitting, BP Cuff Size: Adult)   Pulse 85   Temp 36.8 °C (98.2 °F) (Temporal)   Resp 14   Ht 1.88 m (6' 2\")   Wt 104 kg (230 lb)   SpO2 96%   BMI 29.53 kg/m²  Body mass index is 29.53 kg/m².    Physical Exam  Vitals reviewed.   HENT:      Head: Normocephalic.      Mouth/Throat:      Mouth: Mucous membranes are moist.      Pharynx: Oropharynx is clear.   Eyes:      Pupils: Pupils are equal, round, and reactive to light.   Cardiovascular:      Rate and Rhythm: Normal rate and regular rhythm.   Pulmonary:      Effort: Pulmonary effort is normal. No respiratory " distress.      Breath sounds: No wheezing or rales.   Abdominal:      General: Abdomen is flat. Bowel sounds are normal. There is no distension.      Tenderness: There is no abdominal tenderness. There is no guarding.   Musculoskeletal:         General: Swelling and tenderness (left ankle) present.   Skin:     General: Skin is warm.      Findings: Bruising (lateral left ankle) present.   Neurological:      General: No focal deficit present.      Mental Status: He is alert and oriented to person, place, and time.         Labs: reviewed    Assessment & Plan:     42 y.o. male with the following -     1. Chronic midline thoracic back pain  Chronic, getting better.  Getting PT.    2. Snoring  Needs to schedule sleep study    3. Pure hypercholesterolemia  Chronic, worsening.  The 10-year ASCVD risk score (Charito DK, et al., 2019) is: 1.5%  Discussed lifestyle modifications including diet and exercises. Will follow up with repeat labs in 1 year.  - CBC WITH DIFFERENTIAL; Future  - Comp Metabolic Panel; Future  - Lipid Profile; Future    4. Moderate persistent asthma without complication  Chronic, stable. Managed by Asthma and Allergy  Albuterol prn  Montelukast    5. Acute left ankle pain  Likely ankle sprain.  Tylenol, ibuprofen prn    6. Vitamin D deficiency  Continue OTC vit D supplements  - VITAMIN D,25 HYDROXY (DEFICIENCY); Future    Return in about 1 year (around 8/10/2024) for F/u labs.    Please note that this dictation was created using voice recognition software. I have made every reasonable attempt to correct obvious errors, but I expect that there are errors of grammar and possibly content that I did not discover before finalizing the note.

## 2023-10-03 ENCOUNTER — OFFICE VISIT (OUTPATIENT)
Dept: MEDICAL GROUP | Facility: MEDICAL CENTER | Age: 43
End: 2023-10-03
Payer: COMMERCIAL

## 2023-10-03 VITALS
BODY MASS INDEX: 30.67 KG/M2 | HEART RATE: 72 BPM | SYSTOLIC BLOOD PRESSURE: 110 MMHG | WEIGHT: 239 LBS | RESPIRATION RATE: 14 BRPM | HEIGHT: 74 IN | DIASTOLIC BLOOD PRESSURE: 70 MMHG | TEMPERATURE: 97.8 F | OXYGEN SATURATION: 95 %

## 2023-10-03 DIAGNOSIS — R14.0 BLOATING: ICD-10-CM

## 2023-10-03 DIAGNOSIS — F41.9 ANXIETY: ICD-10-CM

## 2023-10-03 DIAGNOSIS — E78.00 PURE HYPERCHOLESTEROLEMIA: ICD-10-CM

## 2023-10-03 DIAGNOSIS — J45.40 MODERATE PERSISTENT ASTHMA WITHOUT COMPLICATION: ICD-10-CM

## 2023-10-03 PROCEDURE — 99213 OFFICE O/P EST LOW 20 MIN: CPT | Performed by: STUDENT IN AN ORGANIZED HEALTH CARE EDUCATION/TRAINING PROGRAM

## 2023-10-03 PROCEDURE — 3074F SYST BP LT 130 MM HG: CPT | Performed by: STUDENT IN AN ORGANIZED HEALTH CARE EDUCATION/TRAINING PROGRAM

## 2023-10-03 PROCEDURE — 3078F DIAST BP <80 MM HG: CPT | Performed by: STUDENT IN AN ORGANIZED HEALTH CARE EDUCATION/TRAINING PROGRAM

## 2023-10-03 ASSESSMENT — ANXIETY QUESTIONNAIRES
7. FEELING AFRAID AS IF SOMETHING AWFUL MIGHT HAPPEN: SEVERAL DAYS
4. TROUBLE RELAXING: SEVERAL DAYS
GAD7 TOTAL SCORE: 6
2. NOT BEING ABLE TO STOP OR CONTROL WORRYING: SEVERAL DAYS
1. FEELING NERVOUS, ANXIOUS, OR ON EDGE: SEVERAL DAYS
6. BECOMING EASILY ANNOYED OR IRRITABLE: SEVERAL DAYS
5. BEING SO RESTLESS THAT IT IS HARD TO SIT STILL: NOT AT ALL
3. WORRYING TOO MUCH ABOUT DIFFERENT THINGS: SEVERAL DAYS

## 2023-10-03 ASSESSMENT — ENCOUNTER SYMPTOMS
VOMITING: 0
COUGH: 0
DIARRHEA: 1
SHORTNESS OF BREATH: 0
FOCAL WEAKNESS: 0
WEIGHT LOSS: 0
FEVER: 0
CHILLS: 0
NERVOUS/ANXIOUS: 1
ABDOMINAL PAIN: 1
NAUSEA: 0

## 2023-10-03 ASSESSMENT — FIBROSIS 4 INDEX: FIB4 SCORE: 1.03

## 2023-10-03 NOTE — PROGRESS NOTES
"Subjective:     CC: GI issues, anxiety    HPI:   Sarabjit presents today with    Eating a lot protein recently to gain muscle, working out. Reports intermittent bloating, diarrhea for 2 months. Gassy pain. H/o mild lactose intolerance.    Anxiety: exacerbated with relative going through medical problem. Was diagnosed with anxiety 3 years ago after moving to [place], allergies, asthma exacerbated, covid pandemic. Never been on medications.    ROS:  Review of Systems   Constitutional:  Negative for chills, fever, malaise/fatigue and weight loss.   Respiratory:  Negative for cough and shortness of breath.    Cardiovascular:  Negative for chest pain and leg swelling.   Gastrointestinal:  Positive for abdominal pain and diarrhea. Negative for nausea and vomiting.   Neurological:  Negative for focal weakness.   Psychiatric/Behavioral:  The patient is nervous/anxious.        Objective:     Exam:  /70 (BP Location: Left arm, Patient Position: Sitting, BP Cuff Size: Adult)   Pulse 72   Temp 36.6 °C (97.8 °F) (Temporal)   Resp 14   Ht 1.88 m (6' 2\")   Wt 108 kg (239 lb)   SpO2 95%   BMI 30.69 kg/m²  Body mass index is 30.69 kg/m².    Physical Exam  Vitals reviewed.   HENT:      Head: Normocephalic.      Mouth/Throat:      Mouth: Mucous membranes are moist.      Pharynx: Oropharynx is clear.   Eyes:      Pupils: Pupils are equal, round, and reactive to light.   Cardiovascular:      Rate and Rhythm: Normal rate and regular rhythm.   Pulmonary:      Effort: Pulmonary effort is normal. No respiratory distress.      Breath sounds: No wheezing or rales.   Abdominal:      General: Abdomen is flat. Bowel sounds are normal. There is no distension.      Tenderness: There is no abdominal tenderness. There is no guarding.   Skin:     General: Skin is warm.   Neurological:      General: No focal deficit present.      Mental Status: He is alert and oriented to person, place, and time.       Labs: reviewed    Assessment & Plan: "     42 y.o. male with the following -     1. Anxiety  Chronic.  - Referral to Behavioral Health    2. Bloating  H/o mild lactose intolerance.  Avoid dairy products. Reduce protein intake    3. Moderate persistent asthma without complication  Chronic, stable.  - albuterol prn  - flovent     4. Pure hypercholesterolemia  Chronic.  The 10-year ASCVD risk score (Charito DONNELLY, et al., 2019) is: 1.2%  Discussed lifestyle modifications including healthy diet and regular exercises    No follow-ups on file.  Patient will need to establish with new PCP    Please note that this dictation was created using voice recognition software. I have made every reasonable attempt to correct obvious errors, but I expect that there are errors of grammar and possibly content that I did not discover before finalizing the note.

## 2023-11-08 ENCOUNTER — DOCUMENTATION (OUTPATIENT)
Dept: HEALTH INFORMATION MANAGEMENT | Facility: OTHER | Age: 43
End: 2023-11-08
Payer: COMMERCIAL

## 2024-01-21 ENCOUNTER — OFFICE VISIT (OUTPATIENT)
Dept: URGENT CARE | Facility: CLINIC | Age: 44
End: 2024-01-21
Payer: COMMERCIAL

## 2024-01-21 VITALS
WEIGHT: 240 LBS | BODY MASS INDEX: 30.8 KG/M2 | SYSTOLIC BLOOD PRESSURE: 128 MMHG | HEIGHT: 74 IN | RESPIRATION RATE: 18 BRPM | OXYGEN SATURATION: 97 % | DIASTOLIC BLOOD PRESSURE: 80 MMHG | TEMPERATURE: 98.4 F | HEART RATE: 98 BPM

## 2024-01-21 DIAGNOSIS — J01.40 ACUTE NON-RECURRENT PANSINUSITIS: ICD-10-CM

## 2024-01-21 PROCEDURE — 3079F DIAST BP 80-89 MM HG: CPT | Performed by: PHYSICIAN ASSISTANT

## 2024-01-21 PROCEDURE — 99213 OFFICE O/P EST LOW 20 MIN: CPT | Performed by: PHYSICIAN ASSISTANT

## 2024-01-21 PROCEDURE — 3074F SYST BP LT 130 MM HG: CPT | Performed by: PHYSICIAN ASSISTANT

## 2024-01-21 RX ORDER — AMOXICILLIN AND CLAVULANATE POTASSIUM 875; 125 MG/1; MG/1
1 TABLET, FILM COATED ORAL 2 TIMES DAILY
Qty: 14 TABLET | Refills: 0 | Status: SHIPPED | OUTPATIENT
Start: 2024-01-21 | End: 2024-01-28

## 2024-01-21 ASSESSMENT — ENCOUNTER SYMPTOMS
FEVER: 0
ABDOMINAL PAIN: 0
COUGH: 0
SINUS PAIN: 1
VOMITING: 0
DIZZINESS: 1
NAUSEA: 0
DIARRHEA: 0
CHILLS: 0
EYE DISCHARGE: 1
EYE REDNESS: 1

## 2024-01-21 ASSESSMENT — FIBROSIS 4 INDEX: FIB4 SCORE: 1.06

## 2024-01-21 NOTE — PROGRESS NOTES
Subjective     Alexy Escobar is a 43 y.o. male who presents with Sinusitis (For two weeks now, eye discharge)    HPI:  Sarabjit Escobar is a 43 y.o. male who presents today for evaluation of possible sinus infection.  Patient reports that he started to get sick with URI symptoms at the end of December.  He then had a period of almost 1 week where he was feeling better and then over the past 10 days he has had fairly persistent sinus congestion again.  He says that his symptoms do seem to be well-controlled with Sudafed but after not using Sudafed for a few days he starts to get worsening congestion, sinus pressure, eye puffiness, eye discharge.  He says that symptoms start to resolve again once he resumes taking the decongestant.  He has a history of significant allergies for which she gets allergy shots once a month.  He also takes a once daily Allegra, uses Flonase, has a Flovent inhaler that he uses every other day for maintenance.  He says that he feels as though his allergy has been been well-controlled but he was not sure if he needed something more given how long his current symptoms have been present for.  He has not had any fever.  No cough.        Review of Systems   Constitutional:  Negative for chills and fever.   HENT:  Positive for congestion and sinus pain.    Eyes:  Positive for discharge and redness.   Respiratory:  Negative for cough.    Gastrointestinal:  Negative for abdominal pain, diarrhea, nausea and vomiting.   Neurological:  Positive for dizziness (Last week, since resolved).             PMH:  has a past medical history of Asthma (5/19/2014), History of iron deficiency (6/26/2019), Shortness of breath, and Wheezing.  MEDS:   Current Outpatient Medications:     amoxicillin-clavulanate (AUGMENTIN) 875-125 MG Tab, Take 1 Tablet by mouth 2 times a day for 7 days., Disp: 14 Tablet, Rfl: 0    EPINEPHrine (EPIPEN) 0.3 MG/0.3ML Solution Auto-injector solution for injection, USE  "IN CASE OF ANAPHYLAXIS REACTION, Disp: , Rfl:     fluticasone (FLONASE) 50 MCG/ACT nasal spray, INHALE ONE PUFF IN NOSTRILS ONCE A DAY, Disp: , Rfl:     Multiple Vitamin (MULTIVITAMIN ADULT PO), Take 1 Tablet by mouth every day., Disp: , Rfl:     Vitamin D, Cholecalciferol, 25 MCG (1000 UT) Cap, Take  by mouth., Disp: , Rfl:     FLOVENT  MCG/ACT Aerosol, , Disp: , Rfl:     albuterol 108 (90 Base) MCG/ACT Aero Soln inhalation aerosol, Inhale 2 Puffs by mouth every four hours as needed., Disp: 1 Inhaler, Rfl: 5    montelukast (SINGULAIR) 10 MG Tab, Take 1 Tab by mouth every evening., Disp: 30 Tab, Rfl: 11  ALLERGIES: No Known Allergies  SURGHX: No past surgical history on file.  SOCHX:  reports that he has never smoked. He has never used smokeless tobacco. He reports current alcohol use of about 1.8 - 2.4 oz of alcohol per week. He reports that he does not use drugs.  FH: Family history was reviewed, no pertinent findings to report      Objective     /80 (BP Location: Left arm, Patient Position: Sitting, BP Cuff Size: Adult)   Pulse 98   Temp 36.9 °C (98.4 °F) (Temporal)   Resp 18   Ht 1.88 m (6' 2\")   Wt 109 kg (240 lb)   SpO2 97%   BMI 30.81 kg/m²      Physical Exam  Constitutional:       Appearance: He is well-developed.   HENT:      Head: Normocephalic and atraumatic.      Right Ear: Tympanic membrane, ear canal and external ear normal.      Left Ear: Tympanic membrane, ear canal and external ear normal.      Nose: Mucosal edema present. No congestion or rhinorrhea.      Right Sinus: No maxillary sinus tenderness or frontal sinus tenderness.      Left Sinus: No maxillary sinus tenderness or frontal sinus tenderness.      Mouth/Throat:      Lips: Pink.      Mouth: Mucous membranes are moist.      Pharynx: Oropharynx is clear.   Eyes:      Conjunctiva/sclera: Conjunctivae normal.      Pupils: Pupils are equal, round, and reactive to light.   Cardiovascular:      Rate and Rhythm: Normal rate and " regular rhythm.      Heart sounds: Normal heart sounds. No murmur heard.  Pulmonary:      Effort: Pulmonary effort is normal.      Breath sounds: Normal breath sounds. No wheezing.   Musculoskeletal:      Cervical back: Normal range of motion.   Lymphadenopathy:      Cervical: No cervical adenopathy.   Skin:     General: Skin is warm and dry.      Capillary Refill: Capillary refill takes less than 2 seconds.   Neurological:      Mental Status: He is alert and oriented to person, place, and time.   Psychiatric:         Behavior: Behavior normal.         Judgment: Judgment normal.             Assessment & Plan        1. Acute non-recurrent pansinusitis  - amoxicillin-clavulanate (AUGMENTIN) 875-125 MG Tab; Take 1 Tablet by mouth 2 times a day for 7 days.  Dispense: 14 Tablet; Refill: 0  Patient may continue to use OTC Sudafed as needed but do not recommend persistent use at this time.  He should also continue his allergy regimen.  Supportive care also discussed include the use of saline nasal rinses, steam inhalation, use of a cool-mist humidifier in the bedroom at night.  No obvious exam findings suggestive of bacterial infection at this time.  Contingent prescription for Augmentin sent to pharmacy for patient to take if symptoms start to significantly worsen or if they persist for greater than 4 weeks total.              Differential Diagnosis, natural history, and supportive care discussed. Return to the Urgent Care or follow up with your PCP if symptoms fail to resolve, or for any new or worsening symptoms. Emergency room precautions discussed. Patient and/or family appears understanding of information.

## 2024-01-24 ENCOUNTER — TELEPHONE (OUTPATIENT)
Dept: HEALTH INFORMATION MANAGEMENT | Facility: OTHER | Age: 44
End: 2024-01-24
Payer: COMMERCIAL

## 2024-03-26 ENCOUNTER — OFFICE VISIT (OUTPATIENT)
Dept: MEDICAL GROUP | Facility: MEDICAL CENTER | Age: 44
End: 2024-03-26
Payer: COMMERCIAL

## 2024-03-26 VITALS
BODY MASS INDEX: 31.98 KG/M2 | DIASTOLIC BLOOD PRESSURE: 88 MMHG | TEMPERATURE: 97.9 F | HEIGHT: 74 IN | RESPIRATION RATE: 16 BRPM | WEIGHT: 249.2 LBS | HEART RATE: 76 BPM | OXYGEN SATURATION: 97 % | SYSTOLIC BLOOD PRESSURE: 138 MMHG

## 2024-03-26 DIAGNOSIS — F41.1 GAD (GENERALIZED ANXIETY DISORDER): ICD-10-CM

## 2024-03-26 DIAGNOSIS — J45.40 MODERATE PERSISTENT ASTHMA WITHOUT COMPLICATION: ICD-10-CM

## 2024-03-26 DIAGNOSIS — Z00.00 HEALTHCARE MAINTENANCE: ICD-10-CM

## 2024-03-26 DIAGNOSIS — R03.0 WHITE COAT SYNDROME WITHOUT HYPERTENSION: ICD-10-CM

## 2024-03-26 DIAGNOSIS — L98.9 SKIN LESIONS: ICD-10-CM

## 2024-03-26 DIAGNOSIS — E55.9 VITAMIN D DEFICIENCY: ICD-10-CM

## 2024-03-26 DIAGNOSIS — E78.00 PURE HYPERCHOLESTEROLEMIA: ICD-10-CM

## 2024-03-26 DIAGNOSIS — K58.0 IRRITABLE BOWEL SYNDROME WITH DIARRHEA: Primary | ICD-10-CM

## 2024-03-26 ASSESSMENT — PATIENT HEALTH QUESTIONNAIRE - PHQ9
6. FEELING BAD ABOUT YOURSELF - OR THAT YOU ARE A FAILURE OR HAVE LET YOURSELF OR YOUR FAMILY DOWN: NOT AL ALL
SUM OF ALL RESPONSES TO PHQ9 QUESTIONS 1 AND 2: 0
9. THOUGHTS THAT YOU WOULD BE BETTER OFF DEAD, OR OF HURTING YOURSELF: NOT AT ALL
3. TROUBLE FALLING OR STAYING ASLEEP OR SLEEPING TOO MUCH: NOT AT ALL
7. TROUBLE CONCENTRATING ON THINGS, SUCH AS READING THE NEWSPAPER OR WATCHING TELEVISION: NOT AT ALL
5. POOR APPETITE OR OVEREATING: NOT AT ALL
8. MOVING OR SPEAKING SO SLOWLY THAT OTHER PEOPLE COULD HAVE NOTICED. OR THE OPPOSITE, BEING SO FIGETY OR RESTLESS THAT YOU HAVE BEEN MOVING AROUND A LOT MORE THAN USUAL: NOT AT ALL
SUM OF ALL RESPONSES TO PHQ QUESTIONS 1-9: 0
4. FEELING TIRED OR HAVING LITTLE ENERGY: NOT AT ALL
1. LITTLE INTEREST OR PLEASURE IN DOING THINGS: NOT AT ALL
2. FEELING DOWN, DEPRESSED, IRRITABLE, OR HOPELESS: NOT AT ALL

## 2024-03-26 ASSESSMENT — FIBROSIS 4 INDEX: FIB4 SCORE: 1.06

## 2024-03-26 NOTE — PROGRESS NOTES
"Subjective:     CC:   Chief Complaint   Patient presents with    Establish Care    GI Problem    Nevus     L bicep area, L calf area     Finger Injury     R index          HPI:   Sarabjit presents today with      GI symptoms :   Chronic   Unclear etiology   Mild lactose intolerance present - avoiding lactose   But still sometimes has these episodes after long hour basketball games or long flights. Symptoms appear as abdominal cramps and diarrhea which resolved spontaneously . Abdominal pain resolves after BM. No emotional stress     Nevi  - left biceps and left calf   Left arm  for 5 years   Left calf - new    Right index finger pain - cut injury last year June , tip of the finger skip was glued . Healed well but still feels little numbness at the tip ( likely nerve injury after the cut )        Problem   Hyperlipidemia    Chronic, stable   Lab Results   Component Value Date/Time    CHOLSTRLTOT 216 (H) 08/04/2023 0836    TRIGLYCERIDE 129 08/04/2023 0836    HDL 47 08/04/2023 0836     (H) 08/04/2023 0836   The 10-year ASCVD risk score (Charito DONNELLY, et al., 2019) is: 2%  Diet and exercise controlled        Moderate Persistent Asthma Without Complication    Chronic, stable.  Few years ago moved to [place] from [place], since than had more allergies.   Uses resque albuterol 1x per month  Follows Asthma and Allergy Dr. Yap     White Coat Syndrome Without Hypertension   [place] (Generalized Anxiety Disorder)    Chronic, improved   Feels after he discontinue montelukast he is feeling better.     History :   \" Patient's states he went through a phase where he was experiencing increased anxiety and depression due to stress from his job.  Patient also states that once he moved to [place], his allergies and asthma became so bad that he was unable to go outside and unable to exercise without experiencing an exacerbation.  This was all contributing to his overall mental health.  Patient was prescribed escitalopram, however, he never " "started this.  He started doing nonpharmacologic interventions including yoga and mindfulness meditation, which he states he has had good results with.  Patient states today he feels good and denies any need for medication\".              Health Maintenance: Completed    ROS:  ROS    Review of systems unremarkable except for concerns noted by patient or items listed.    Please see HPI for additional ROS.      Objective:     Exam:  /88 (BP Location: Left arm, Patient Position: Sitting)   Pulse 76   Temp 36.6 °C (97.9 °F) (Temporal)   Resp 16   Ht 1.88 m (6' 2\")   Wt 113 kg (249 lb 3.2 oz)   SpO2 97%   BMI 32.00 kg/m²  Body mass index is 32 kg/m².    Physical Exam  Constitutional:       General: He is not in acute distress.     Appearance: Normal appearance.   HENT:      Head: Normocephalic.   Cardiovascular:      Rate and Rhythm: Normal rate and regular rhythm.      Pulses: Normal pulses.      Heart sounds: Normal heart sounds.   Pulmonary:      Effort: Pulmonary effort is normal.      Breath sounds: Normal breath sounds.   Abdominal:      General: Bowel sounds are normal. There is no distension.      Tenderness: There is no abdominal tenderness.   Skin:     General: Skin is warm.      Capillary Refill: Capillary refill takes less than 2 seconds.      Findings: Lesion present.   Neurological:      Mental Status: He is alert and oriented to person, place, and time.   Psychiatric:         Mood and Affect: Mood normal.         Behavior: Behavior normal.             Labs: reviewed     Assessment & Plan:     43 y.o. male with the following -       1. Pure hypercholesterolemia  Chronic, stable   The 10-year ASCVD risk score (Charito DK, et al., 2019) is: 2%  Plan:  Recommended to continue low fat healthy diet and regular exercise       2. White coat syndrome without hypertension   Chronic, stable   Continue healthy diet and exercise     3. MARY (generalized anxiety disorder)  Chronic, stable   Continue lifestyle " modifications at home to support stress relief and decrease anxiety.  Continue following up with Counsellor     4. Moderate persistent asthma without complication  Chronic, stable   Continue resque albuterol q6 hr as needed     5. Irritable bowel syndrome with diarrhea  Chronic problem   New diagnosis based on symptoms   Plan  Recommended Continue lifestyle modifications at home to support stress relief and decrease excessive /strenuous physical activity     6. Skin lesions  New problem   Might need biopsy for further evaluation   Plan:  - Referral to Dermatology      I spent a total of 40 minutes with record review, exam, communication with the patient, communication with other providers, and documentation of this encounter.      Return in about 6 months (around 9/26/2024) for preventive wellness.    Please note that this dictation was created using voice recognition software. I have made every reasonable attempt to correct obvious errors, but I expect that there are errors of grammar and possibly content that I did not discover before finalizing the note.

## 2024-04-18 ENCOUNTER — APPOINTMENT (RX ONLY)
Dept: URBAN - METROPOLITAN AREA CLINIC 4 | Facility: CLINIC | Age: 44
Setting detail: DERMATOLOGY
End: 2024-04-18

## 2024-04-18 DIAGNOSIS — L82.1 OTHER SEBORRHEIC KERATOSIS: ICD-10-CM

## 2024-04-18 DIAGNOSIS — Z71.89 OTHER SPECIFIED COUNSELING: ICD-10-CM

## 2024-04-18 DIAGNOSIS — L81.4 OTHER MELANIN HYPERPIGMENTATION: ICD-10-CM

## 2024-04-18 DIAGNOSIS — D18.0 HEMANGIOMA: ICD-10-CM

## 2024-04-18 DIAGNOSIS — D22 MELANOCYTIC NEVI: ICD-10-CM

## 2024-04-18 PROBLEM — D23.62 OTHER BENIGN NEOPLASM OF SKIN OF LEFT UPPER LIMB, INCLUDING SHOULDER: Status: ACTIVE | Noted: 2024-04-18

## 2024-04-18 PROBLEM — D22.5 MELANOCYTIC NEVI OF TRUNK: Status: ACTIVE | Noted: 2024-04-18

## 2024-04-18 PROBLEM — D18.01 HEMANGIOMA OF SKIN AND SUBCUTANEOUS TISSUE: Status: ACTIVE | Noted: 2024-04-18

## 2024-04-18 PROBLEM — D23.72 OTHER BENIGN NEOPLASM OF SKIN OF LEFT LOWER LIMB, INCLUDING HIP: Status: ACTIVE | Noted: 2024-04-18

## 2024-04-18 PROCEDURE — 99203 OFFICE O/P NEW LOW 30 MIN: CPT

## 2024-04-18 PROCEDURE — ? COUNSELING

## 2024-04-18 PROCEDURE — ? SUNSCREEN RECOMMENDATIONS

## 2024-04-18 ASSESSMENT — LOCATION DETAILED DESCRIPTION DERM
LOCATION DETAILED: LEFT SUPERIOR UPPER BACK
LOCATION DETAILED: LEFT SUPERIOR MEDIAL UPPER BACK
LOCATION DETAILED: LEFT MEDIAL UPPER BACK

## 2024-04-18 ASSESSMENT — LOCATION ZONE DERM: LOCATION ZONE: TRUNK

## 2024-04-18 ASSESSMENT — LOCATION SIMPLE DESCRIPTION DERM: LOCATION SIMPLE: LEFT UPPER BACK

## 2024-09-10 ENCOUNTER — HOSPITAL ENCOUNTER (OUTPATIENT)
Dept: LAB | Facility: MEDICAL CENTER | Age: 44
End: 2024-09-10
Attending: STUDENT IN AN ORGANIZED HEALTH CARE EDUCATION/TRAINING PROGRAM
Payer: COMMERCIAL

## 2024-09-10 DIAGNOSIS — E78.00 PURE HYPERCHOLESTEROLEMIA: ICD-10-CM

## 2024-09-10 DIAGNOSIS — F41.1 GAD (GENERALIZED ANXIETY DISORDER): ICD-10-CM

## 2024-09-10 DIAGNOSIS — Z00.00 HEALTHCARE MAINTENANCE: ICD-10-CM

## 2024-09-10 DIAGNOSIS — E55.9 VITAMIN D DEFICIENCY: ICD-10-CM

## 2024-09-10 LAB
25(OH)D3 SERPL-MCNC: 28 NG/ML (ref 30–100)
ALBUMIN SERPL BCP-MCNC: 4.5 G/DL (ref 3.2–4.9)
ALBUMIN/GLOB SERPL: 1.6 G/DL
ALP SERPL-CCNC: 78 U/L (ref 30–99)
ALT SERPL-CCNC: 22 U/L (ref 2–50)
ANION GAP SERPL CALC-SCNC: 10 MMOL/L (ref 7–16)
AST SERPL-CCNC: 31 U/L (ref 12–45)
BILIRUB SERPL-MCNC: 0.9 MG/DL (ref 0.1–1.5)
BUN SERPL-MCNC: 15 MG/DL (ref 8–22)
CALCIUM ALBUM COR SERPL-MCNC: 9.1 MG/DL (ref 8.5–10.5)
CALCIUM SERPL-MCNC: 9.5 MG/DL (ref 8.5–10.5)
CHLORIDE SERPL-SCNC: 105 MMOL/L (ref 96–112)
CHOLEST SERPL-MCNC: 211 MG/DL (ref 100–199)
CO2 SERPL-SCNC: 25 MMOL/L (ref 20–33)
CREAT SERPL-MCNC: 1 MG/DL (ref 0.5–1.4)
ERYTHROCYTE [DISTWIDTH] IN BLOOD BY AUTOMATED COUNT: 43.1 FL (ref 35.9–50)
GFR SERPLBLD CREATININE-BSD FMLA CKD-EPI: 96 ML/MIN/1.73 M 2
GLOBULIN SER CALC-MCNC: 2.8 G/DL (ref 1.9–3.5)
GLUCOSE SERPL-MCNC: 94 MG/DL (ref 65–99)
HCT VFR BLD AUTO: 44.5 % (ref 42–52)
HDLC SERPL-MCNC: 46 MG/DL
HGB BLD-MCNC: 14.9 G/DL (ref 14–18)
LDLC SERPL CALC-MCNC: 142 MG/DL
MCH RBC QN AUTO: 30.6 PG (ref 27–33)
MCHC RBC AUTO-ENTMCNC: 33.5 G/DL (ref 32.3–36.5)
MCV RBC AUTO: 91.4 FL (ref 81.4–97.8)
PLATELET # BLD AUTO: 179 K/UL (ref 164–446)
PMV BLD AUTO: 11.8 FL (ref 9–12.9)
POTASSIUM SERPL-SCNC: 4.7 MMOL/L (ref 3.6–5.5)
PROT SERPL-MCNC: 7.3 G/DL (ref 6–8.2)
RBC # BLD AUTO: 4.87 M/UL (ref 4.7–6.1)
SODIUM SERPL-SCNC: 140 MMOL/L (ref 135–145)
TRIGL SERPL-MCNC: 115 MG/DL (ref 0–149)
TSH SERPL DL<=0.005 MIU/L-ACNC: 1.53 UIU/ML (ref 0.38–5.33)
WBC # BLD AUTO: 4 K/UL (ref 4.8–10.8)

## 2024-09-10 PROCEDURE — 84443 ASSAY THYROID STIM HORMONE: CPT

## 2024-09-10 PROCEDURE — 80061 LIPID PANEL: CPT

## 2024-09-10 PROCEDURE — 36415 COLL VENOUS BLD VENIPUNCTURE: CPT

## 2024-09-10 PROCEDURE — 80053 COMPREHEN METABOLIC PANEL: CPT

## 2024-09-10 PROCEDURE — 82306 VITAMIN D 25 HYDROXY: CPT

## 2024-09-10 PROCEDURE — 85027 COMPLETE CBC AUTOMATED: CPT

## 2024-09-26 ENCOUNTER — OFFICE VISIT (OUTPATIENT)
Dept: MEDICAL GROUP | Facility: MEDICAL CENTER | Age: 44
End: 2024-09-26
Payer: COMMERCIAL

## 2024-09-26 VITALS
WEIGHT: 234.8 LBS | HEART RATE: 69 BPM | OXYGEN SATURATION: 96 % | DIASTOLIC BLOOD PRESSURE: 80 MMHG | HEIGHT: 74 IN | BODY MASS INDEX: 30.13 KG/M2 | TEMPERATURE: 96.8 F | SYSTOLIC BLOOD PRESSURE: 138 MMHG

## 2024-09-26 DIAGNOSIS — Z00.00 WELLNESS EXAMINATION: Primary | ICD-10-CM

## 2024-09-26 DIAGNOSIS — R03.0 WHITE COAT SYNDROME WITHOUT HYPERTENSION: ICD-10-CM

## 2024-09-26 DIAGNOSIS — E55.9 VITAMIN D DEFICIENCY: ICD-10-CM

## 2024-09-26 DIAGNOSIS — E78.00 PURE HYPERCHOLESTEROLEMIA: ICD-10-CM

## 2024-09-26 PROCEDURE — 3079F DIAST BP 80-89 MM HG: CPT | Performed by: STUDENT IN AN ORGANIZED HEALTH CARE EDUCATION/TRAINING PROGRAM

## 2024-09-26 PROCEDURE — 99396 PREV VISIT EST AGE 40-64: CPT | Performed by: STUDENT IN AN ORGANIZED HEALTH CARE EDUCATION/TRAINING PROGRAM

## 2024-09-26 PROCEDURE — 3075F SYST BP GE 130 - 139MM HG: CPT | Performed by: STUDENT IN AN ORGANIZED HEALTH CARE EDUCATION/TRAINING PROGRAM

## 2024-09-26 PROCEDURE — 99212 OFFICE O/P EST SF 10 MIN: CPT | Mod: 25 | Performed by: STUDENT IN AN ORGANIZED HEALTH CARE EDUCATION/TRAINING PROGRAM

## 2024-09-26 RX ORDER — ERGOCALCIFEROL 1.25 MG/1
50000 CAPSULE ORAL
Qty: 12 CAPSULE | Refills: 0 | Status: SHIPPED | OUTPATIENT
Start: 2024-09-26

## 2024-09-26 SDOH — ECONOMIC STABILITY: INCOME INSECURITY: IN THE LAST 12 MONTHS, WAS THERE A TIME WHEN YOU WERE NOT ABLE TO PAY THE MORTGAGE OR RENT ON TIME?: NO

## 2024-09-26 SDOH — ECONOMIC STABILITY: TRANSPORTATION INSECURITY
IN THE PAST 12 MONTHS, HAS THE LACK OF TRANSPORTATION KEPT YOU FROM MEDICAL APPOINTMENTS OR FROM GETTING MEDICATIONS?: NO

## 2024-09-26 SDOH — ECONOMIC STABILITY: FOOD INSECURITY: WITHIN THE PAST 12 MONTHS, THE FOOD YOU BOUGHT JUST DIDN'T LAST AND YOU DIDN'T HAVE MONEY TO GET MORE.: NEVER TRUE

## 2024-09-26 SDOH — ECONOMIC STABILITY: FOOD INSECURITY: WITHIN THE PAST 12 MONTHS, YOU WORRIED THAT YOUR FOOD WOULD RUN OUT BEFORE YOU GOT MONEY TO BUY MORE.: NEVER TRUE

## 2024-09-26 SDOH — HEALTH STABILITY: PHYSICAL HEALTH: ON AVERAGE, HOW MANY DAYS PER WEEK DO YOU ENGAGE IN MODERATE TO STRENUOUS EXERCISE (LIKE A BRISK WALK)?: 5 DAYS

## 2024-09-26 SDOH — HEALTH STABILITY: PHYSICAL HEALTH: ON AVERAGE, HOW MANY MINUTES DO YOU ENGAGE IN EXERCISE AT THIS LEVEL?: 60 MIN

## 2024-09-26 SDOH — ECONOMIC STABILITY: TRANSPORTATION INSECURITY
IN THE PAST 12 MONTHS, HAS LACK OF TRANSPORTATION KEPT YOU FROM MEETINGS, WORK, OR FROM GETTING THINGS NEEDED FOR DAILY LIVING?: NO

## 2024-09-26 SDOH — ECONOMIC STABILITY: INCOME INSECURITY: HOW HARD IS IT FOR YOU TO PAY FOR THE VERY BASICS LIKE FOOD, HOUSING, MEDICAL CARE, AND HEATING?: NOT HARD AT ALL

## 2024-09-26 SDOH — HEALTH STABILITY: MENTAL HEALTH
STRESS IS WHEN SOMEONE FEELS TENSE, NERVOUS, ANXIOUS, OR CAN'T SLEEP AT NIGHT BECAUSE THEIR MIND IS TROUBLED. HOW STRESSED ARE YOU?: NOT AT ALL

## 2024-09-26 SDOH — ECONOMIC STABILITY: HOUSING INSECURITY
IN THE LAST 12 MONTHS, WAS THERE A TIME WHEN YOU DID NOT HAVE A STEADY PLACE TO SLEEP OR SLEPT IN A SHELTER (INCLUDING NOW)?: NO

## 2024-09-26 SDOH — ECONOMIC STABILITY: TRANSPORTATION INSECURITY
IN THE PAST 12 MONTHS, HAS LACK OF RELIABLE TRANSPORTATION KEPT YOU FROM MEDICAL APPOINTMENTS, MEETINGS, WORK OR FROM GETTING THINGS NEEDED FOR DAILY LIVING?: NO

## 2024-09-26 ASSESSMENT — SOCIAL DETERMINANTS OF HEALTH (SDOH)
IN A TYPICAL WEEK, HOW MANY TIMES DO YOU TALK ON THE PHONE WITH FAMILY, FRIENDS, OR NEIGHBORS?: THREE TIMES A WEEK
HOW OFTEN DO YOU ATTEND CHURCH OR RELIGIOUS SERVICES?: NEVER
HOW OFTEN DO YOU GET TOGETHER WITH FRIENDS OR RELATIVES?: NEVER
WITHIN THE PAST 12 MONTHS, YOU WORRIED THAT YOUR FOOD WOULD RUN OUT BEFORE YOU GOT THE MONEY TO BUY MORE: NEVER TRUE
HOW OFTEN DO YOU ATTENT MEETINGS OF THE CLUB OR ORGANIZATION YOU BELONG TO?: NEVER
HOW OFTEN DO YOU HAVE A DRINK CONTAINING ALCOHOL: 2-3 TIMES A WEEK
HOW OFTEN DO YOU ATTEND CHURCH OR RELIGIOUS SERVICES?: NEVER
HOW HARD IS IT FOR YOU TO PAY FOR THE VERY BASICS LIKE FOOD, HOUSING, MEDICAL CARE, AND HEATING?: NOT HARD AT ALL
HOW OFTEN DO YOU GET TOGETHER WITH FRIENDS OR RELATIVES?: NEVER
HOW OFTEN DO YOU HAVE SIX OR MORE DRINKS ON ONE OCCASION: NEVER
HOW MANY DRINKS CONTAINING ALCOHOL DO YOU HAVE ON A TYPICAL DAY WHEN YOU ARE DRINKING: 1 OR 2
DO YOU BELONG TO ANY CLUBS OR ORGANIZATIONS SUCH AS CHURCH GROUPS UNIONS, FRATERNAL OR ATHLETIC GROUPS, OR SCHOOL GROUPS?: NO
IN A TYPICAL WEEK, HOW MANY TIMES DO YOU TALK ON THE PHONE WITH FAMILY, FRIENDS, OR NEIGHBORS?: THREE TIMES A WEEK
DO YOU BELONG TO ANY CLUBS OR ORGANIZATIONS SUCH AS CHURCH GROUPS UNIONS, FRATERNAL OR ATHLETIC GROUPS, OR SCHOOL GROUPS?: NO
HOW OFTEN DO YOU ATTENT MEETINGS OF THE CLUB OR ORGANIZATION YOU BELONG TO?: NEVER
IN THE PAST 12 MONTHS, HAS THE ELECTRIC, GAS, OIL, OR WATER COMPANY THREATENED TO SHUT OFF SERVICE IN YOUR HOME?: NO

## 2024-09-26 ASSESSMENT — LIFESTYLE VARIABLES
HOW MANY STANDARD DRINKS CONTAINING ALCOHOL DO YOU HAVE ON A TYPICAL DAY: 1 OR 2
HOW OFTEN DO YOU HAVE SIX OR MORE DRINKS ON ONE OCCASION: NEVER
AUDIT-C TOTAL SCORE: 3
SKIP TO QUESTIONS 9-10: 1
HOW OFTEN DO YOU HAVE A DRINK CONTAINING ALCOHOL: 2-3 TIMES A WEEK

## 2024-09-26 ASSESSMENT — FIBROSIS 4 INDEX: FIB4 SCORE: 1.55

## 2024-09-26 NOTE — PROGRESS NOTES
Subjective:     CC:   Chief Complaint   Patient presents with    Annual Exam    Lab Results       HPI:   Sarabjit Escobar is a 43 y.o. male who presents for annual exam  Verbal consent was acquired by the patient to use [employer] ambient listening note generation during this visit Yes   History of Present Illness  The patient is a 43-year-old male who presents for evaluation of multiple medical concerns.    He reports occasional fatigue, which he attributes to his vitamin D deficiency. He has been supplementing with vitamin D and overall feels well. He is curious about the potential effects of increasing his vitamin D dosage.    He has noticed a decrease in his cholesterol levels. In March 2024, he was at his highest weight due to a diet rich in protein and eggs as he was trying to build muscle. He admits to not being mindful of his diet during this period. However, he has since reduced his intake of eggs and red meat, and increased his physical activity, particularly running. He hopes these changes will continue to lower his cholesterol levels. He also mentions that protein shakes upset his stomach, so he prefers to get his protein from eggs.    He maintains an active lifestyle, exercising five days a week. He acknowledges that intense exercise can sometimes elevate blood pressure. His previous doctor suggested he might have white coat syndrome, as his blood pressure readings are typically lower at home (around 118/78) than in the clinic. He admits to feeling anxious during clinic visits, which may contribute to higher readings. He also mentions that discussing his health issues, such as stomach problems, can cause him stress, which in turn affects his blood pressure. He has been making an effort to manage his stress levels.    He has a blister on his hand, which he believes is healing slowly. He reports no new skin lesions. A dermatologist previously informed him that he has a condition that causes  nodular or swollen areas on his skin. He also mentions that he gets rashes when he comes into contact with certain plants, which cause his skin to raise.    He has lost between 10 to 15 pounds through diet and exercise, and aims to reach a weight of 210 pounds. He has gained 16 pounds of muscle over the past few years. He currently weighs around 190 pounds and hopes to lose about a pound per week. He has increased his running distance from 3 miles three times a week to 5 to 6 miles three times a week, with a goal of reaching 222 pounds by the end of the year.    He goes to the dentist every 6 months. He wears daily lenses now and glasses at night. He sees an eye doctor for checkups. He uses sunscreen. He reports no trouble with blind spots while driving. He reports no heartburn or acid reflux. He reports no urination problems. He reports no history of kidney stones. He reports no constipation problems. He drinks enough water. He was having some issues where, especially if he did yard work for like 5 hours one day and then ran 6 miles the next day, he was noticing he was getting dehydrated, so he was having some stomach issues from that dehydration, but not really that badly. Since he last saw me, he only had one episode.    FAMILY HISTORY  He denies any family history of high cholesterol.    ALLERGIES  He is allergic to PLANTS.    Results  Laboratory Studies  Vitamin D level is low. Cholesterol level has decreased.    Last Tdap: 2019   Received HPV series: Aged out  Hx STDs: No    Exercise: strenuous regular exercise, aerobic > 3 hours a week  Diet: healthy balanced diet       He  has a past medical history of Allergy, Asthma (05/19/2014), History of iron deficiency (06/26/2019), Shortness of breath, and Wheezing.  He  has a past surgical history that includes eye surgery.    Family History   Problem Relation Age of Onset    Diabetes Father     Stroke Paternal Aunt 66        mild    Cancer Paternal Grandmother 90         Paternal grandmother pancratic cancer at 93     Social History     Tobacco Use    Smoking status: Never    Smokeless tobacco: Never   Vaping Use    Vaping status: Never Used   Substance Use Topics    Alcohol use: Yes     Alcohol/week: 3.0 oz     Types: 5 Cans of beer per week     Comment: OCC    Drug use: No     He  reports being sexually active and has had partner(s) who are female. He reports using the following method of birth control/protection: Condom.    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 12 Capsule 0    EPINEPHrine (EPIPEN) 0.3 MG/0.3ML Solution Auto-injector solution for injection USE IN CASE OF ANAPHYLAXIS REACTION      fluticasone (FLONASE) 50 MCG/ACT nasal spray INHALE ONE PUFF IN NOSTRILS ONCE A DAY      Multiple Vitamin (MULTIVITAMIN ADULT PO) Take 1 Tablet by mouth every day.      Vitamin D, Cholecalciferol, 25 MCG (1000 UT) Cap Take  by mouth.      FLOVENT  MCG/ACT Aerosol       albuterol 108 (90 Base) MCG/ACT Aero Soln inhalation aerosol Inhale 2 Puffs by mouth every four hours as needed. 1 Inhaler 5     No current facility-administered medications for this visit.     Allergies   Allergen Reactions    Seasonal       "Pollen,plants       Review of Systems  Constitutional: Negative for fever, chills and malaise/fatigue.   HENT: Negative for congestion.    Eyes: Negative for pain.   Respiratory: Negative for cough and shortness of breath.    Cardiovascular: Negative for chest pain and leg swelling.   Gastrointestinal: Negative for nausea, vomiting, abdominal pain and diarrhea.   Genitourinary: Negative for dysuria and hematuria.   Skin: Negative for rash.   Neurological: Negative for dizziness, focal weakness and headaches.   Endo/Heme/Allergies: Does not bruise/bleed easily.   Psychiatric/Behavioral: Negative for depression.  The patient is not nervous/anxious.      Objective:   /80 (BP Location: Left arm, Patient Position: Sitting, BP Cuff Size: Adult)   Pulse 69   Temp 36 °C (96.8 °F) (Temporal)   Ht 1.88 m (6' 2\")   Wt 107 kg (234 lb 12.8 oz)   SpO2 96%   BMI 30.15 kg/m²      Wt Readings from Last 4 Encounters:   09/26/24 107 kg (234 lb 12.8 oz)   03/26/24 113 kg (249 lb 3.2 oz)   01/21/24 109 kg (240 lb)   10/03/23 108 kg (239 lb)     Physical Exam:  Constitutional: Well-developed and well-nourished. Not diaphoretic. No distress.   Skin: Skin is warm and dry. No rash noted.  Head: Atraumatic without lesions.  Eyes: Conjunctivae and extraocular motions are normal. Pupils are equal, round, and reactive to light. No scleral icterus.   Ears:  External ears unremarkable. Tympanic membranes clear and intact.  Nose: Nares patent. Septum midline. Turbinates without erythema nor edema. No discharge.   Mouth/Throat: Tongue normal. Oropharynx is clear and moist. Posterior pharynx without erythema or exudates.  Neck: Supple, trachea midline. Normal range of motion. No thyromegaly present. No lymphadenopathy--cervical or supraclavicular.  Cardiovascular: Regular rate and rhythm, S1 and S2 without murmur, rubs, or gallops.    Respiratory: Effort normal. Clear to auscultation throughout. No adventitious sounds.   Abdomen: Soft, " non tender, and without distention. Active bowel sounds in all four quadrants.   Extremities: No cyanosis, clubbing, erythema, nor edema. Distal pulses intact and symmetric.   Musculoskeletal: All major joints AROM full in all directions without pain.  Neurological: Alert and oriented x 3. Grossly non-focal. Strength and sensation grossly intact. DTRs 2+/3 and symmetric.   Psychiatric:  Behavior, mood, and affect are appropriate.      Assessment and Plan:     1. Wellness examination    2. Vitamin D deficiency  - ergocalciferol (DRISDOL) 03920 UNIT capsule; Take 1 Capsule by mouth every 7 days.  Dispense: 12 Capsule; Refill: 0    3. Pure hypercholesterolemia    4. White coat syndrome without hypertension    Problem   Vitamin D Deficiency    Low vit D   Chronic   Low levels in lab 28  Plan:  Vit D prescription given      Hyperlipidemia    Chronic, stable   Lab Results   Component Value Date/Time    CHOLSTRLTOT 211 (H) 09/10/2024 1210    TRIGLYCERIDE 115 09/10/2024 1210    HDL 46 09/10/2024 1210     (H) 09/10/2024 1210     Plan:    The 10-year ASCVD risk score (Charito DK, et al., 2019) is: 2.1%  Recommended to continue low fat healthy diet and regular exercise          White Coat Syndrome Without Hypertension    White coat hypertension  BP here : 135-140/80-90  Home BP is usually normal range.   /70 avg at home           Health maintenance:    Discussed:  regular exercise   healthy diet  Sun protection w SPF  Biannual dentist visit   Substance Abuse: Declined  Safe in relationship. Yes  Seat belts, bike helmet, gun safety discussed  Last lab results were reviewed by me today   Labs per orders  Immunizations per orders  Patient counseled about skin care, diet, supplements, and exercise.  Discussed diet and exercise, STD prevention, HIV risk factors and prevention, and adequate intake of calcium and vitamin D     Follow-up: Return in about 1 year (around 9/26/2025) for preventive wellness.

## 2024-12-15 ENCOUNTER — OFFICE VISIT (OUTPATIENT)
Dept: URGENT CARE | Facility: CLINIC | Age: 44
End: 2024-12-15
Payer: COMMERCIAL

## 2024-12-15 VITALS
OXYGEN SATURATION: 97 % | WEIGHT: 232 LBS | HEIGHT: 73 IN | RESPIRATION RATE: 16 BRPM | DIASTOLIC BLOOD PRESSURE: 102 MMHG | TEMPERATURE: 97.6 F | HEART RATE: 98 BPM | BODY MASS INDEX: 30.75 KG/M2 | SYSTOLIC BLOOD PRESSURE: 148 MMHG
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
## 2024-12-15 DIAGNOSIS — R03.0 ELEVATED BLOOD PRESSURE READING IN OFFICE WITHOUT DIAGNOSIS OF HYPERTENSION: ICD-10-CM

## 2024-12-15 PROCEDURE — 99214 OFFICE O/P EST MOD 30 MIN: CPT | Performed by: NURSE PRACTITIONER

## 2024-12-15 ASSESSMENT — ENCOUNTER SYMPTOMS
CHILLS: 0
TINGLING: 0
FEVER: 0
SENSORY CHANGE: 0
FOCAL WEAKNESS: 0
MYALGIAS: 0

## 2024-12-15 ASSESSMENT — FIBROSIS 4 INDEX: FIB4 SCORE: 1.55

## 2024-12-15 NOTE — PROGRESS NOTES
Subjective     Alexy Escobar is a 43 y.o. male who presents with Cat Bite (Cat bite on right and left index finger)            HPI  New problem.  Patient is a very pleasant 43-year-old male who presents with a cat bite to his left index finger as well as to his right index finger that he sustained approximately an hour prior to arrival to the clinic.  He states he was attempting to medicate his cat and his cat bit him.  He is up-to-date on his tetanus.  He denies distal paresthesia or focal weakness.  The cat is vaccinated.    Seasonal  Current Outpatient Medications on File Prior to Visit   Medication Sig Dispense Refill    ergocalciferol (DRISDOL) 40104 UNIT capsule Take 1 Capsule by mouth every 7 days. 12 Capsule 0    EPINEPHrine (EPIPEN) 0.3 MG/0.3ML Solution Auto-injector solution for injection USE IN CASE OF ANAPHYLAXIS REACTION      fluticasone (FLONASE) 50 MCG/ACT nasal spray INHALE ONE PUFF IN NOSTRILS ONCE A DAY      Multiple Vitamin (MULTIVITAMIN ADULT PO) Take 1 Tablet by mouth every day.      FLOVENT  MCG/ACT Aerosol       albuterol 108 (90 Base) MCG/ACT Aero Soln inhalation aerosol Inhale 2 Puffs by mouth every four hours as needed. 1 Inhaler 5    Vitamin D, Cholecalciferol, 25 MCG (1000 UT) Cap Take  by mouth. (Patient not taking: Reported on 12/15/2024)       No current facility-administered medications on file prior to visit.     Social History     Socioeconomic History    Marital status:      Spouse name: Not on file    Number of children: Not on file    Years of education: Not on file    Highest education level: Bachelor's degree (e.g., BA, AB, BS)   Occupational History    Not on file   Tobacco Use    Smoking status: Never    Smokeless tobacco: Never   Vaping Use    Vaping status: Never Used   Substance and Sexual Activity    Alcohol use: Not Currently     Alcohol/week: 3.0 oz     Types: 5 Cans of beer per week     Comment: [facility]    Drug use: No    Sexual activity: Yes      Partners: Female     Birth control/protection: Condom   Other Topics Concern    Not on file   Social History Narrative    Not on file     Social Drivers of Health     Financial Resource Strain: Low Risk  (9/26/2024)    Overall Financial Resource Strain (CARDIA)     Difficulty of Paying Living Expenses: Not hard at all   Food Insecurity: No Food Insecurity (9/26/2024)    Hunger Vital Sign     Worried About Running Out of Food in the Last Year: Never true     Ran Out of Food in the Last Year: Never true   Transportation Needs: No Transportation Needs (9/26/2024)    PRAPARE - Transportation     Lack of Transportation (Medical): No     Lack of Transportation (Non-Medical): No   Physical Activity: Sufficiently Active (9/26/2024)    Exercise Vital Sign     Days of Exercise per Week: 5 days     Minutes of Exercise per Session: 60 min   Stress: No Stress Concern Present (9/26/2024)    [employer] [place] [place] [employer] - Occupational Stress Questionnaire     Feeling of Stress : Not at all   Social Connections: Moderately Isolated (9/26/2024)    Social Connection and Isolation Panel [NHANES]     Frequency of Communication with Friends and Family: Three times a week     Frequency of Social Gatherings with Friends and Family: Never     Attends Sikh Services: Never     Active Member of Clubs or Organizations: No     Attends Club or Organization Meetings: Never     Marital Status:    Intimate Partner Violence: Not on file   Housing Stability: Low Risk  (9/26/2024)    Housing Stability Vital Sign     Unable to Pay for Housing in the Last Year: No     Number of Times Moved in the Last Year: 0     Homeless in the Last Year: No     Breast Cancer-related family history is not on file.      Review of Systems   Constitutional:  Negative for chills, fever and malaise/fatigue.   Musculoskeletal:  Negative for joint pain and myalgias.   Skin:         Puncture wounds to hands/index fingers.   Neurological:  Negative for  "tingling, sensory change and focal weakness.              Objective     BP (!) 148/102 (BP Location: Left arm, Patient Position: Sitting, BP Cuff Size: Adult)   Pulse 98   Temp 36.4 °C (97.6 °F) (Temporal)   Resp 16   Ht 1.854 m (6' 1\")   Wt 105 kg (232 lb)   SpO2 97%   BMI 30.61 kg/m²      Physical Exam  Constitutional:       Appearance: Normal appearance.   Cardiovascular:      Rate and Rhythm: Normal rate and regular rhythm.      Heart sounds: No murmur heard.  Pulmonary:      Effort: Pulmonary effort is normal.      Breath sounds: Normal breath sounds.   Musculoskeletal:         General: Normal range of motion.   Skin:     General: Skin is warm and dry.      Capillary Refill: Capillary refill takes less than 2 seconds.      Comments: Puncture wounds noted to bilateral index fingers and hands.  Most are superficial with the exception of the one to his left index finger that it is along the cuticle of his fingernail.   Neurological:      General: No focal deficit present.      Mental Status: He is alert and oriented to person, place, and time.                             Assessment & Plan   1. Cat bite, initial encounter  amoxicillin-clavulanate (AUGMENTIN) 875-125 MG Tab      2. Elevated blood pressure reading in office without diagnosis of hypertension            Augmentin x 5 days.  Patient is advised to clean the wounds twice daily with just soap and water.  He may use antibacterial ointment as directed.  He is up-to-date on tetanus.  He does have an elevated blood pressure in the clinic however he states this could be from the adrenaline from the cat bite as well as he has a history of whitecoat syndrome.     Assessment & Plan  Elevated blood pressure reading in office without diagnosis of hypertension                       "

## 2024-12-16 DIAGNOSIS — E55.9 VITAMIN D DEFICIENCY: ICD-10-CM

## 2024-12-17 RX ORDER — ERGOCALCIFEROL 1.25 MG/1
50000 CAPSULE, LIQUID FILLED ORAL
Qty: 12 CAPSULE | Refills: 2 | Status: SHIPPED | OUTPATIENT
Start: 2024-12-17

## 2025-01-08 ENCOUNTER — PATIENT MESSAGE (OUTPATIENT)
Dept: MEDICAL GROUP | Facility: MEDICAL CENTER | Age: 45
End: 2025-01-08
Payer: COMMERCIAL